# Patient Record
Sex: FEMALE | Race: WHITE | HISPANIC OR LATINO | Employment: UNEMPLOYED | URBAN - METROPOLITAN AREA
[De-identification: names, ages, dates, MRNs, and addresses within clinical notes are randomized per-mention and may not be internally consistent; named-entity substitution may affect disease eponyms.]

---

## 2017-01-26 ENCOUNTER — ALLSCRIPTS OFFICE VISIT (OUTPATIENT)
Dept: OTHER | Facility: OTHER | Age: 27
End: 2017-01-26

## 2017-04-04 DIAGNOSIS — Z97.5 PRESENCE OF (INTRAUTERINE) CONTRACEPTIVE DEVICE: ICD-10-CM

## 2017-04-06 ENCOUNTER — HOSPITAL ENCOUNTER (OUTPATIENT)
Dept: RADIOLOGY | Age: 27
Discharge: HOME | End: 2017-04-06
Payer: COMMERCIAL

## 2017-04-06 DIAGNOSIS — Z97.5 PRESENCE OF (INTRAUTERINE) CONTRACEPTIVE DEVICE: ICD-10-CM

## 2017-04-06 PROCEDURE — 76856 US EXAM PELVIC COMPLETE: CPT

## 2017-04-06 PROCEDURE — 76830 TRANSVAGINAL US NON-OB: CPT

## 2018-01-09 NOTE — PROGRESS NOTES
2016         RE: Chance Jefferson                                    To: [facility]   MR#: [id number]                                   [address]   : [place] [address]   ENC: [id number]                             Fax: [phone]   (Exam #: [id number])      The LMP of this 22year old,  G4, P1-0-2-1 patient was OCT 21 2015, giving   her an ART of 2016 and a current gestational age of 42 weeks 2 days   by dates  A sonographic examination was performed on 2016 using real   time equipment  The ultrasound examination was performed using abdominal   technique  The patient has a BMI of 40 6  Her blood pressure today was   117/76  Earliest ultrasound found in her record: 16  12w0d  16  ART      Problem list:   1  Morbid obesity   2  Chronic hypertension   3  Previous C/S      Cardiac motion was observed at 157 bpm       INDICATIONS      chronic hypertension   morbid obesity   evaluate amniotic fluid volume      Exam Types      Amniotic Fluid Index      RESULTS      Fetus # 1 of 1   Vertex presentation   Placenta Location = Anterior   No placenta previa   Placenta Grade = II      AMNIOTIC FLUID      Q1: 6 2      Q2: 7 1      Q3: 4 7      Q4: 4 8   JUSTYN Total = 22 8 cm   Amniotic Fluid: Normal      The NST was reactive with no decelerations  IMPRESSION      Wilson IUP   Vertex presentation   Regular fetal heart rate of 157 bpm   Anterior placenta   No placenta previa      GENERAL COMMENT      Her follow up care should include twice weekly NST's and a once weekly   amniotic fluid assessment, along with her daily kick counts  KOURTNEY Mayfield M D     Maternal-Fetal Medicine   Electronically signed 16 16:52

## 2018-01-10 NOTE — PROGRESS NOTES
2016         RE: Shamika Frazier                                    To: [facility]   MR#: [id number]                                   [address]   : [place] [address] [facility], [address] [place] [address]   ENC: [id number]                             Fax: [phone]   (Exam #: [id number])      The LMP of this 22year old,  G4, P1-0-2-1 patient was OCT 21 2015, giving   her an ART of 2016 and a current gestational age of 29 weeks 2 days   by dates  A sonographic examination was performed on 2016 using   real time equipment  The ultrasound examination was performed using   abdominal technique  The patient has a BMI of 39 9  Her initial blood   pressure today was 135/91, and it was later recorded at 137/66  Earliest ultrasound found in her record: 16  12w0d  16  ART      Problem list:   1  Morbid obesity   2  Gestational hypertension vs  chronic hypertension   3  Previous C/S      Cardiac motion was observed at 132 bpm       INDICATIONS      chronic hypertension      Exam Types      Amniotic Fluid Index   NST      RESULTS      Fetus # 1 of 1   Vertex presentation   Placenta Location = Anterior   No placenta previa   Placenta Grade = II      AMNIOTIC FLUID      Q1: 4 7      Q2: 5 9      Q3: 5 3      Q4: 4 9   JUSTYN Total = 20 8 cm   Amniotic Fluid: Normal      IMPRESSION      Wilson IUP   Vertex presentation   Regular fetal heart rate of 132 bpm   Anterior placenta   No placenta previa      RECOMMENDATION      JUSTYN: 1 Week   NST: 2X per week      KOURTNEY Shannon M D     Maternal-Fetal Medicine   Electronically signed 16 14:30

## 2018-01-13 NOTE — PROGRESS NOTES
2016         RE: Eufemia Lai                                    To: [facility]   MR#: [id number]                                   [address]   : [place] [address] [facility] [address], [address] [facility]   ENC: [id number]                             Fax: [phone]   (Exam #: [id number])      The LMP of this 22year old,  G4, P1-0-2-1 patient was OCT 21 2015, giving   her an ART of 2016 and a current gestational age of 42 weeks 2 days   by dates  A sonographic examination was performed on 2016 using real   time equipment  The ultrasound examination was performed using abdominal   technique  The patient has a BMI of 40 4  Her blood pressure today was   116/80  Earliest ultrasound found in her record: 16  12w0d  16  ART      Problem list:   1  Morbid obesity   2  Chronic hypertension   3  Previous C/S      Cardiac motion was observed at 161 bpm       INDICATIONS      chronic hypertension   morbid obesity      Exam Types      Level I      RESULTS      Fetus # 1 of 1   Vertex presentation   Fetal growth appeared normal   Placenta Location = Anterior   No placenta previa   Placenta Grade = II      MEASUREMENTS (* Included In Average GA)      AC              33 9 cm        38 weeks 0 days* (81%)   BPD              8 8 cm        35 weeks 4 days* (40%)   HC              32 7 cm        36 weeks 4 days* (44%)   Femur            7 1 cm        35 weeks 6 days* (53%)      Cerebellum       5 1 cm        36 weeks 4 days   CisternaMagna    8 1 mm      HC/AC           0 97   FL/AC           0 21   FL/BPD          0 81   EFW (Ac/Fl/Hc)  3147 grams - 6 lbs 15 oz                 (64%)      THE AVERAGE GESTATIONAL AGE is 36 weeks 4 days +/- 21 days        AMNIOTIC FLUID      Q1: 5 2      Q2: 7 5      Q3: 3 5      Q4: 2 6   JUSTYN Total = 18 8 cm   Amniotic Fluid: Normal      ANATOMY      Head                                    Normal   Heart Normal   Stomach                                 Normal   Right Kidney                            Normal   Left Kidney                             Normal   Bladder                                 Normal   Placenta                                Normal      ANATOMY DETAILS      Visualized Appearing Sonographically Normal:   HEAD: (Calvarium, BPD Level, Cavum, Lateral Ventricles, Choroid Plexus,   Cerebellum, Cisterna Magna); HEART: (Four Chamber View, Proximal Left   Outflow, Proximal Right Outflow, 3 Vessel Trachea, Short Axis of Greater   Vessels, Interventricular Septum, Interatrial Septum, Cardiac Axis,   Cardiac Position);    STOMACH, RIGHT KIDNEY, LEFT KIDNEY, BLADDER, PLACENTA      IMPRESSION      Wilson IUP   36 weeks and 4 days by this ultrasound  (ART=JUL 25 2016)   Vertex presentation   Fetal growth appeared normal   Regular fetal heart rate of 161 bpm   Anterior placenta   No placenta previa      GENERAL COMMENT      On exam today the patient appears well, in no acute distress, and denies   any complaints  Her abdomen is non-tender  There has been appropriate interval fetal growth  Good fetal movement and   tone are seen  The amniotic fluid volume appears normal   The placenta is   anterior and it appears sonographically normal    The patient was informed   of today's findings and all of her questions were answered  The   limitations of ultrasound were reviewed with the patient  Preeclampsia   precautions were reviewed with the patient  The patient verbalized her   understanding of these instructions  We discussed follow-up in detail and I recommend the patient return return   for continued twice weekly antepartum surveillance for the indication of   chronic hypertension  Thank you very much for allowing us to participate in the care of this   very nice patient  Should you have any questions, please do not hesitate   to contact our office        Please note, in addition to the time spent discussing the results of the   ultrasound, I spent approximately 10 minutes of face-to-face time with the   patient, greater than 50% of which was spent in counseling and the   coordination of care for this patient  KOURTNEY Wright M D     Electronically signed 07/01/16 13:04

## 2018-01-14 VITALS
WEIGHT: 196 LBS | HEIGHT: 63 IN | DIASTOLIC BLOOD PRESSURE: 88 MMHG | SYSTOLIC BLOOD PRESSURE: 134 MMHG | BODY MASS INDEX: 34.73 KG/M2

## 2018-01-15 NOTE — PROGRESS NOTES
2016         RE: Linn Duncan                                    To: [facility]   MR#: [id number]                                   [address]   : [place] [address] [facility] 100 [facility]   ENC: [id number]                             Fax: [phone]   (Exam #: [id number])      The LMP of this 22year old,  G4, P1-0-2-1 patient was OCT 21 2015, giving   her an ART of 2016 and a current gestational age of 29 weeks 6 days   by dates  A sonographic examination was performed on 2016 using   real time equipment  The ultrasound examination was performed using   abdominal technique  The patient has a BMI of 39 7  Her blood pressure   today was 130/75  Earliest ultrasound found in her record: 16  12w0d  16  ART      Problem list:   1  Increased BMI with a normal Glucola screen   2  Gestational HTN- Increasing blood pressures since  at 20+   weeks  Her mother has a history of preeclampsia  She has a bp of 148/81 in   our system on 2013 at 5 weeks of pregnancy that resulted in a 6 week   missed ab  She denies any other history of HTN  3  Hx of a prior CS for a 7lb 5 oz baby  The placenta is not near her   prior  section scar  4  She had declined genetic screening  Cardiac motion was observed at 146 bpm       INDICATIONS      previous    chronic hypertension      Exam Types      Amniotic Fluid Index      RESULTS      Fetus # 1 of 1   Vertex presentation   Placenta Location = Anterior   No placenta previa   Placenta Grade = II      AMNIOTIC FLUID      Q1: 3 6      Q2: 2 6      Q3: 5 7      Q4: 5 8   JUSTYN Total = 17 7 cm   Amniotic Fluid: Normal      BIOPHYSICAL PROFILE      The Biophysical Profile score was 10/10     Breathin  Movement: 2  Tone: 2  AFV: 2  NST: 2      IMPRESSION      Wilson IUP   Vertex presentation   Irregular fetal heart rate of 146 bpm   Anterior placenta   No placenta previa      GENERAL COMMENT      The patient presented today for antepartum fetal surveillance secondary to   chronic htn  She had a reassuring biophysical profile  The NST was   reactive and reassuring  The amniotic fluid index was 17 7cm, which is   normal for gestational age  Recommend continued twice weekly fetal testing secondary to chronic htn  Thank very much for allowing us to participate in the care of your   patient  Should you have any questions, please do not hesitate to contact   our office  KOURTNEY Jeronimo M D     Electronically signed 06/14/16 15:32

## 2018-01-16 NOTE — PROGRESS NOTES
KATHLEEN 10 2016         RE: Sarina White                                    To: [facility]   MR#: [id number]                                   [address]   : [place] [address] [facility], [address] [facility]   ENC: [id number]                             Fax: [phone]   (Exam #: [id number])      The LMP of this 22year old,  G4, P1-0-2-1 patient was OCT 21 2015, giving   her an ART of 2016 and a current gestational age of 29 weeks 2 days   by dates  A sonographic examination was performed on KATHLEEN 10 2016 using   real time equipment  The ultrasound examination was performed using   abdominal technique  The patient has a BMI of 38 8  Her blood pressure   today was 134/74  Earliest ultrasound found in her record: 16  12w0d  16  ART      Problem list:   1  Increased BMI with a normal Glucola screen   2  Gestational HTN- Increasing blood pressures since  at 20+   weeks  Her mother has a history of preeclampsia  She has a bp of 148/81 in   our system on 2013 at 5 weeks of pregnancy that resulted in a 6 week   missed ab  She denies any other history of HTN  3  Hx of a prior CS for a 7lb 5 oz baby  The placenta is not near her   prior  section scar  4  She had declined genetic screening  Cardiac motion was observed at 154 bpm       INDICATIONS      previous    morbid obesity   hypertension, pregnancy induced      Exam Types      Amniotic Fluid Index      RESULTS      Fetus # 1 of 1   Vertex presentation   Placenta Location = Anterior   Placenta Grade = II      AMNIOTIC FLUID      Q1: 4 5      Q2: 3 8      Q3: 4 5      Q4: 6 2   JUSTYN Total = 19 0 cm   Amniotic Fluid: Normal      BIOPHYSICAL PROFILE      The Biophysical Profile score was 8/8     Breathin  Movement: 2  Tone: 2  AFV: 2      IMPRESSION      Wilson IUP   Vertex presentation   Regular fetal heart rate of 154 bpm   Anterior placenta      GENERAL COMMENT The nonstress test today showed a normal baseline, the presence of   accelerations, moderate variability, and no decelerations and thus the NST   was reassuring  The amniotic fluid was normal today as well and thus both   of these tests showed reassuring results  KOURTNEY Gutierres M D     Maternal-Fetal Medicine   Electronically signed 06/10/16 11:13

## 2018-01-16 NOTE — PROGRESS NOTES
KATHLEEN 3 2016         RE: David Ring                                    To: [facility]   MR#: [id number]                                   [address]   : [place] [address] [facility], [address] [facility]   ENC: [id number]                             Fax: [phone]   (Exam #: [id number])      The LMP of this 22year old,  G4, P1-0-2-1 patient was OCT 21 2015, giving   her an ART of 2016 and a current gestational age of 26 weeks 2 days   by dates  A sonographic examination was performed on KATHLEEN 3 2016 using real   time equipment  The ultrasound examination was performed using abdominal   technique  The patient has a BMI of 39 5  Her blood pressure today was   111/78  Earliest ultrasound found in her record: 16  12w0d  16  ART      Problem list:   1  Increased BMI with a normal Glucola screen   2  Gestational HTN- Increasing blood pressures since  at 20+   weeks  Her mother has a history of preeclampsia  She has a bp of 148/81 in   our system on 2013 at 5 weeks of pregnancy that resulted in a 6 week   missed ab  She denies any other history of HTN  3  Hx of a prior CS for a 7lb 5 oz baby  The placenta is not near her   prior  section scar  4  She had declined genetic screening        Cardiac motion was observed at 155 bpm       INDICATIONS      previous    Interval growth assesment   morbid obesity      Exam Types      Level I      RESULTS      Fetus # 1 of 1   Vertex presentation   Fetal growth appeared normal   Placenta Location = Anterior   No placenta previa   Placenta Grade = II      AMNIOTIC FLUID      Q1: 5 2      Q2: 6 4      Q3: 4 4      Q4: 7 9   JUSTYN Total = 23 9 cm   Amniotic Fluid: Normal      MEASUREMENTS (* Included In Average GA)      AC              27 9 cm        32 weeks 0 days* (42%)   BPD              8 3 cm        33 weeks 1 day * (57%)   HC              29 9 cm        32 weeks 6 days* (42%) Femur            6 4 cm        32 weeks 4 days* (57%)      Cerebellum       4 2 cm        34 weeks 3 days      HC/AC           1 07   FL/AC           0 23   FL/BPD          0 77   EFW (Ac/Fl/Hc)  1971 grams - 4 lbs 6 oz                 (46%)      THE AVERAGE GESTATIONAL AGE is 32 weeks 5 days +/- 18 days  ANATOMY COMMENTS      Fetal anatomy has been previously documented; no anomalies were   identified  No fetal structural abnormality is identified on the Level I   survey today  Follow up anatomy of the lateral ventricles, 4 chamber view,   short axis, diaphragm,  kidneys, stomach and bladder appear normal  Fetal   interval growth and amniotic fluid volume are normal The placenta is not   near her prior  section scar  BIOPHYSICAL PROFILE      The Biophysical Profile score was 10/10  Breathin  Movement: 2  Tone: 2  AFV: 2  NST: 2      IMPRESSION      Wilson IUP   32 weeks and 5 days by this ultrasound  (ART=2016)   Vertex presentation   Fetal growth appeared normal   Regular fetal heart rate of 155 bpm   Anterior placenta   No placenta previa      GENERAL COMMENT      The patient was informed of the findings and counseled about the   limitations of the exam in detecting all forms of fetal congenital   abnormalities  She denies any vaginal bleeding or uterine cramping/contractions  She does   feel fetal movement  Exam shows she is comfortable and her abdomen is non tender  Follow up recommended:   1  Recommend baseline preeclamptic labs which were ordered an returned as   normal except for mild anemia  Recommend iron supplementation  2  Will start twice weekly nst and weekly bahman till delivery  3  Recommend a growth scan in 4 weeks  4  Will treat as possible GHTN but she may have CHTN based on elevated bps   seen at 5 weeks in a prior pregnancy     5  Recommend delivery at 39 weeks since she may have CHTN unless she   develops signs of preeclampsia at which time recommend delivery at 37   weeks  Romayne Keys, R D M S Veria Guadeloupe, M D     Maternal-Fetal Medicine   Electronically signed 06/04/16 11:40

## 2018-01-17 NOTE — PROGRESS NOTES
JUL 15 2016         RE: Joan Kebede                                    To: [facility]   MR#: [id number]                                   [address]   : [place] [address] [facility] [address] [facility]   ENC: [id number]                             Fax: [phone]   (Exam #: [id number])      The LMP of this 22year old,  G4, P1-0-2-1 patient was OCT 21 2015, giving   her an ART of 2016 and a current gestational age of 37 weeks 2 days   by dates  A sonographic examination was performed on JUL 15 2016 using   real time equipment  The ultrasound examination was performed using   abdominal technique  The patient has a BMI of 41 4  Her blood pressure   today was 111/75  Earliest ultrasound found in her record: 16  12w0d  16  ART      Problem list:   1  Morbid obesity   2  Chronic hypertension   3  Previous C/S      Cardiac motion was observed at 147 bpm       INDICATIONS      chronic hypertension   morbid obesity      Exam Types      Amniotic Fluid Index   NST      RESULTS      Fetus # 1 of 1   Vertex presentation   Placenta Location = Anterior   No placenta previa   Placenta Grade = III      The NST was reactive with no decelerations  AMNIOTIC FLUID      Q1: 5 4      Q2: 6 4      Q3: 3 7      Q4: 4 3   JUSTYN Total = 19 8 cm   Amniotic Fluid: Normal      IMPRESSION      Wilson IUP   Vertex presentation   Regular fetal heart rate of 147 bpm   Anterior placenta   No placenta previa      RECOMMENDATION      JUSTYN: 1 Week   NST: 2X per week      KOURTNEY Bennett Ala, M D     Maternal-Fetal Medicine   Electronically signed 07/15/16 12:59

## 2018-01-17 NOTE — RESULT NOTES
Verified Results  (1) CBC/PLT/DIFF [id number] 09:52AM Edson Miller     Test Name Result Flag Reference   WBC COUNT 9 64 Thousand/uL  4 31-10 16   RBC COUNT 4 09 Million/uL  3 81-5 12   HEMOGLOBIN 10 4 g/dL L 11 5-15 4   HEMATOCRIT 32 8 % L 34 8-46  1   MCV 80 fL L 82-98   MCH 25 4 pg L 26 8-34 3   MCHC 31 7 g/dL  31 4-37 4   RDW 14 7 %  11 6-15 1   MPV 10 5 fL  8 9-12 7   PLATELET COUNT 090 Thousands/uL  149-390   nRBC AUTOMATED 0 /100 WBCs     NEUTROPHILS RELATIVE PERCENT 77 % H 43-75   LYMPHOCYTES RELATIVE PERCENT 15 %  14-44   MONOCYTES RELATIVE PERCENT 7 %  4-12   EOSINOPHILS RELATIVE PERCENT 1 %  0-6   BASOPHILS RELATIVE PERCENT 0 %  0-1   NEUTROPHILS ABSOLUTE COUNT 7 34 Thousands/?L  1 85-7 62   LYMPHOCYTES ABSOLUTE COUNT 1 46 Thousands/?L  0 60-4 47   MONOCYTES ABSOLUTE COUNT 0 67 Thousand/?L  0 17-1 22   EOSINOPHILS ABSOLUTE COUNT 0 11 Thousand/?L  0 00-0 61   BASOPHILS ABSOLUTE COUNT 0 01 Thousands/?L  0 00-0 10     (1) URIC ACID [id number] 09:52AM Edson Miller     Test Name Result Flag Reference   URIC ACID 2 6 mg/dL  2 0-6 8   Specimen collection should occur prior to Metamizole administration due to the potential for falsely depressed results  (1) COMPREHENSIVE METABOLIC PANEL [id number] 56:19TK Edson Miller     Test Name Result Flag Reference   GLUCOSE,RANDM 104 mg/dL     If the patient is fasting, the ADA then defines impaired fasting glucose as > 100 mg/dL and diabetes as > or equal to 123 mg/dL     SODIUM 138 mmol/L  136-145   POTASSIUM 4 1 mmol/L  3 5-5 3   CHLORIDE 105 mmol/L  100-108   CARBON DIOXIDE 24 mmol/L  21-32   ANION GAP (CALC) 9 mmol/L  4-13   BLOOD UREA NITROGEN 5 mg/dL  5-25   CREATININE 0 56 mg/dL L 0 60-1 30   Standardized to IDMS reference method   CALCIUM 9 1 mg/dL  8 3-10 1   BILI, TOTAL 0 17 mg/dL L 0 20-1 00   ALK PHOSPHATAS 129 U/L H    ALT (SGPT) 15 U/L  12-78   AST(SGOT) 14 U/L  5-45   ALBUMIN 2 8 g/dL L 3 5-5 0   TOTAL PROTEIN 6 4 g/dL  6 4-8 2   eGFR Non-African American      >60 0 ml/min/1 73sq m   [facility] Disease Education Program recommendations are as follows:  GFR calculation is accurate only with a steady state creatinine  Chronic Kidney disease less than 60 ml/min/1 73 sq  meters  Kidney failure less than 15 ml/min/1 73 sq  meters       (1) LD (LDH) [id number] 09:52AM Ethan Miller     Test Name Result Flag Reference   LD (LDH) 149 U/L       (1) URINE PROTEIN CREATININE RATIO [id number] 09:52AM Ethan Miller     Test Name Result Flag Reference   CREATININE URINE 210 0 mg/dL     URINE PROTEIN:CREATININE RATIO 0 10  0 00-0 10   URINE PROTEIN 21 mg/dL

## 2018-01-17 NOTE — MISCELLANEOUS
Message  Left message on pt cell phone with preeclamptic lab results-WNL  Pt to contact [facility] if questions  Active Problems    1  Anemia of pregnancy (648 20,285 9) (O99 019)   2  Asthma (493 90) (J45 909)   3  BMI 36 0-36 9,adult (V85 36) (Z68 36)   4  Encounter for pregnancy related examination in third trimester (V22 1) (Z34 83)   5  Hyperemesis of pregnancy (643 00) (O21 0)   6  Obesity in pregnancy (649 10) (O99 210)   7  Pregnancy (V22 2) (Z33 1)   8  Pregnancy-induced hypertension in third trimester (642 33) (O13 3)   9  Previous  section complicating pregnancy (118 97) (O34 21)    Current Meds   1  Iron 325 (65 Fe) MG Oral Tablet; TAKE 1 TAB BEFORE BED ON EMPTY STOMACH OR   WITH ORANGE JUICE  DONT TAKEWITH MILK OR CALCIUM PRODUCTS; Therapy: [id number] to (Dedrick Mejia)  Requested for: [id number]; Last   Rx:2016 Ordered   2  Prenatal Vitamin TABS; TAKE 1 TABLET DAILY AS DIRECTED; Therapy: (Recorded:2016) to Recorded   3  Tylenol TABS; TAKE TABLET  PRN; Therapy: (Recorded:2016) to Recorded    Allergies    1  No Known Drug Allergies    2  Animal dander   3  No Known Food Allergies   4  Pollen   5   Seasonal    Signatures   Electronically signed by : Zan Padilla RN; 2016  1:36PM EST                       (Author)

## 2020-07-28 ENCOUNTER — TELEPHONE (OUTPATIENT)
Dept: OBGYN CLINIC | Facility: CLINIC | Age: 30
End: 2020-07-28

## 2020-08-06 ENCOUNTER — TELEPHONE (OUTPATIENT)
Dept: OBGYN CLINIC | Facility: CLINIC | Age: 30
End: 2020-08-06

## 2020-08-06 ENCOUNTER — INITIAL PRENATAL (OUTPATIENT)
Dept: OBGYN CLINIC | Facility: CLINIC | Age: 30
End: 2020-08-06
Payer: COMMERCIAL

## 2020-08-06 VITALS
SYSTOLIC BLOOD PRESSURE: 130 MMHG | BODY MASS INDEX: 43.05 KG/M2 | DIASTOLIC BLOOD PRESSURE: 74 MMHG | TEMPERATURE: 98.2 F | WEIGHT: 243 LBS

## 2020-08-06 DIAGNOSIS — Z34.82 PRENATAL CARE, SUBSEQUENT PREGNANCY, SECOND TRIMESTER: ICD-10-CM

## 2020-08-06 DIAGNOSIS — O10.919 CHRONIC HYPERTENSION IN PREGNANCY: ICD-10-CM

## 2020-08-06 DIAGNOSIS — Z34.90 PREGNANCY, UNSPECIFIED GESTATIONAL AGE: Primary | ICD-10-CM

## 2020-08-06 PROCEDURE — 99213 OFFICE O/P EST LOW 20 MIN: CPT | Performed by: PHYSICIAN ASSISTANT

## 2020-08-06 RX ORDER — CETIRIZINE HYDROCHLORIDE 10 MG/1
TABLET ORAL
COMMUNITY
End: 2021-05-14

## 2020-08-06 RX ORDER — ACETAMINOPHEN 325 MG/1
TABLET ORAL
COMMUNITY
End: 2021-05-14

## 2020-08-06 RX ORDER — CALCIUM CARBONATE 750 MG/1
TABLET, CHEWABLE ORAL
COMMUNITY
Start: 2020-06-05 | End: 2021-05-14

## 2020-08-06 RX ORDER — PNV NO.95/FERROUS FUM/FOLIC AC 28MG-0.8MG
1 TABLET ORAL DAILY
COMMUNITY
End: 2020-08-06

## 2020-08-06 RX ORDER — DIMENHYDRINATE 50 MG/1
TABLET ORAL
COMMUNITY
Start: 2020-04-05 | End: 2021-05-14

## 2020-08-06 RX ORDER — ALBUTEROL SULFATE 90 UG/1
2 AEROSOL, METERED RESPIRATORY (INHALATION) EVERY 4 HOURS PRN
COMMUNITY
End: 2022-05-25 | Stop reason: SDUPTHER

## 2020-08-06 NOTE — PROGRESS NOTES
NOB: Patient is transfer at 27wks from [place], reports pregnancy going well so far  Reports history of chronic HTN which has continued through this pregnancy  Not on any hypertensive medications  Good FM, Mild nausea, no vomiting, takes dramamine as needed  Reports mild cramping, more stretching pains, no contractions  Mild swelling in feet and ankles was worse during drive from [place] 3 weeks ago, has improved significantly  Improves with rest  No HA, cramping, VB, LOF, domestic violence, or smoking  Tolerating PNV  Script for 1 hr GTT, CBC, RPR, as well as baseline preeclamptic lab work given  Patient is not planning on breastfeeding  30 wk packet given, reviewed perineal massage although patient is planning for repeat   Reviewed birth control options  Patient is considering tubal ligation  State insurance consent signed  Reviewed recommendation for Level 2/growth ultrasound at [facility]  Referral given and [facility] information given  Urine: neg protein/neg glucose  Records release for [place] records signed today  Continue routine prenatal care  Return to office in 4 weeks for ob check

## 2020-08-06 NOTE — TELEPHONE ENCOUNTER
----- Message from Cody Fay PA-C sent at 2020  3:38 PM EDT -----  Please schedule patient for repeat  at 39 weeks

## 2020-08-08 ENCOUNTER — APPOINTMENT (OUTPATIENT)
Dept: LAB | Facility: HOSPITAL | Age: 30
End: 2020-08-08
Payer: COMMERCIAL

## 2020-08-08 DIAGNOSIS — O10.919 CHRONIC HYPERTENSION IN PREGNANCY: ICD-10-CM

## 2020-08-08 DIAGNOSIS — Z34.82 PRENATAL CARE, SUBSEQUENT PREGNANCY, SECOND TRIMESTER: ICD-10-CM

## 2020-08-08 LAB
ALBUMIN SERPL BCP-MCNC: 2.7 G/DL (ref 3.5–5)
ALP SERPL-CCNC: 91 U/L (ref 46–116)
ALT SERPL W P-5'-P-CCNC: 16 U/L (ref 12–78)
ANION GAP SERPL CALCULATED.3IONS-SCNC: 10 MMOL/L (ref 4–13)
AST SERPL W P-5'-P-CCNC: 13 U/L (ref 5–45)
BACTERIA UR QL AUTO: ABNORMAL /HPF
BASOPHILS # BLD AUTO: 0.02 THOUSANDS/ΜL (ref 0–0.1)
BASOPHILS NFR BLD AUTO: 0 % (ref 0–1)
BILIRUB SERPL-MCNC: 0.2 MG/DL (ref 0.2–1)
BILIRUB UR QL STRIP: NEGATIVE
BUN SERPL-MCNC: 5 MG/DL (ref 5–25)
CALCIUM SERPL-MCNC: 8.6 MG/DL (ref 8.3–10.1)
CHLORIDE SERPL-SCNC: 105 MMOL/L (ref 100–108)
CLARITY UR: ABNORMAL
CO2 SERPL-SCNC: 24 MMOL/L (ref 21–32)
COLOR UR: YELLOW
CREAT SERPL-MCNC: 0.74 MG/DL (ref 0.6–1.3)
CREAT UR-MCNC: 120 MG/DL
EOSINOPHIL # BLD AUTO: 0.28 THOUSAND/ΜL (ref 0–0.61)
EOSINOPHIL NFR BLD AUTO: 3 % (ref 0–6)
ERYTHROCYTE [DISTWIDTH] IN BLOOD BY AUTOMATED COUNT: 13.4 % (ref 11.6–15.1)
GFR SERPL CREATININE-BSD FRML MDRD: 109 ML/MIN/1.73SQ M
GLUCOSE 1H P 50 G GLC PO SERPL-MCNC: 146 MG/DL
GLUCOSE P FAST SERPL-MCNC: 145 MG/DL (ref 65–99)
GLUCOSE UR STRIP-MCNC: ABNORMAL MG/DL
HCT VFR BLD AUTO: 33.7 % (ref 34.8–46.1)
HGB BLD-MCNC: 10.8 G/DL (ref 11.5–15.4)
HGB UR QL STRIP.AUTO: NEGATIVE
IMM GRANULOCYTES # BLD AUTO: 0.12 THOUSAND/UL (ref 0–0.2)
IMM GRANULOCYTES NFR BLD AUTO: 1 % (ref 0–2)
KETONES UR STRIP-MCNC: NEGATIVE MG/DL
LDH FLD L TO P-CCNC: 125 U/L
LEUKOCYTE ESTERASE UR QL STRIP: ABNORMAL
LYMPHOCYTES # BLD AUTO: 1.39 THOUSANDS/ΜL (ref 0.6–4.47)
LYMPHOCYTES NFR BLD AUTO: 15 % (ref 14–44)
MCH RBC QN AUTO: 28.3 PG (ref 26.8–34.3)
MCHC RBC AUTO-ENTMCNC: 32 G/DL (ref 31.4–37.4)
MCV RBC AUTO: 88 FL (ref 82–98)
MONOCYTES # BLD AUTO: 0.52 THOUSAND/ΜL (ref 0.17–1.22)
MONOCYTES NFR BLD AUTO: 6 % (ref 4–12)
NEUTROPHILS # BLD AUTO: 7.07 THOUSANDS/ΜL (ref 1.85–7.62)
NEUTS SEG NFR BLD AUTO: 75 % (ref 43–75)
NITRITE UR QL STRIP: NEGATIVE
NON-SQ EPI CELLS URNS QL MICRO: ABNORMAL /HPF
NRBC BLD AUTO-RTO: 0 /100 WBCS
PH UR STRIP.AUTO: 6.5 [PH]
PLATELET # BLD AUTO: 201 THOUSANDS/UL (ref 149–390)
PMV BLD AUTO: 11 FL (ref 8.9–12.7)
POTASSIUM SERPL-SCNC: 3.9 MMOL/L (ref 3.5–5.3)
PROT SERPL-MCNC: 6.5 G/DL (ref 6.4–8.2)
PROT UR STRIP-MCNC: NEGATIVE MG/DL
PROT UR-MCNC: 20 MG/DL
PROT/CREAT UR: 0.17 MG/G{CREAT} (ref 0–0.1)
RBC # BLD AUTO: 3.82 MILLION/UL (ref 3.81–5.12)
RBC #/AREA URNS AUTO: ABNORMAL /HPF
SODIUM SERPL-SCNC: 139 MMOL/L (ref 136–145)
SP GR UR STRIP.AUTO: 1.02 (ref 1–1.03)
URATE SERPL-MCNC: 3.3 MG/DL (ref 2–6.8)
UROBILINOGEN UR QL STRIP.AUTO: 0.2 E.U./DL
WBC # BLD AUTO: 9.4 THOUSAND/UL (ref 4.31–10.16)
WBC #/AREA URNS AUTO: ABNORMAL /HPF

## 2020-08-08 PROCEDURE — 82570 ASSAY OF URINE CREATININE: CPT

## 2020-08-08 PROCEDURE — 84156 ASSAY OF PROTEIN URINE: CPT

## 2020-08-08 PROCEDURE — 83615 LACTATE (LD) (LDH) ENZYME: CPT

## 2020-08-08 PROCEDURE — 84550 ASSAY OF BLOOD/URIC ACID: CPT

## 2020-08-08 PROCEDURE — 86592 SYPHILIS TEST NON-TREP QUAL: CPT

## 2020-08-08 PROCEDURE — 81001 URINALYSIS AUTO W/SCOPE: CPT | Performed by: PHYSICIAN ASSISTANT

## 2020-08-08 PROCEDURE — 36415 COLL VENOUS BLD VENIPUNCTURE: CPT

## 2020-08-08 PROCEDURE — 85025 COMPLETE CBC W/AUTO DIFF WBC: CPT

## 2020-08-08 PROCEDURE — 82950 GLUCOSE TEST: CPT

## 2020-08-08 PROCEDURE — 80053 COMPREHEN METABOLIC PANEL: CPT

## 2020-08-10 ENCOUNTER — TELEPHONE (OUTPATIENT)
Dept: PERINATAL CARE | Facility: CLINIC | Age: 30
End: 2020-08-10

## 2020-08-10 DIAGNOSIS — R73.09 ABNORMAL GLUCOSE TOLERANCE TEST (GTT): Primary | ICD-10-CM

## 2020-08-10 LAB — RPR SER QL: NORMAL

## 2020-08-10 NOTE — TELEPHONE ENCOUNTER
Attempted to reach patient by phone and left voicemail to confirm appointment for MFM ultrasound  1 support person ( must be over the age of 15) may accompany you for your appointment  If you or your support person have traveled outside the state in the past 2 weeks, please call and notify our office today #[phone]  You and your support person must wear a mask ,covering nose and mouth,during your entire visit  You and your support person will have temperature screened upon arrival     To minimize your exposure in our waiting room, please call our office prior to entering the building  Check in and rooming questions will be done via phone  We will give you directions when to enter for your appointment  Inside office # provided:  [place] Bhagat line: [phone]  [place] line:  [phone]  [place] line:  [address] line:  [phone]  [place] Mcnamara line:  [phone]  [place] line:  [phone]    IF you are not feeling well- cough, fever, shortness of breath or any flu like symptoms, contact your primary care physician or [address] [place] [address] [place]    Any questions with these instructions please call Maternal Fetal Medicine nurse line today @ # [phone]

## 2020-08-11 ENCOUNTER — ROUTINE PRENATAL (OUTPATIENT)
Dept: PERINATAL CARE | Facility: OTHER | Age: 30
End: 2020-08-11
Payer: COMMERCIAL

## 2020-08-11 VITALS
BODY MASS INDEX: 43.12 KG/M2 | WEIGHT: 243.4 LBS | HEART RATE: 108 BPM | SYSTOLIC BLOOD PRESSURE: 126 MMHG | TEMPERATURE: 96.9 F | DIASTOLIC BLOOD PRESSURE: 95 MMHG | HEIGHT: 63 IN

## 2020-08-11 DIAGNOSIS — Z34.82 PRENATAL CARE, SUBSEQUENT PREGNANCY, SECOND TRIMESTER: ICD-10-CM

## 2020-08-11 DIAGNOSIS — J45.909 ASTHMA DURING PREGNANCY: ICD-10-CM

## 2020-08-11 DIAGNOSIS — Z3A.28 28 WEEKS GESTATION OF PREGNANCY: ICD-10-CM

## 2020-08-11 DIAGNOSIS — Z36.89 ENCOUNTER FOR FETAL ANATOMIC SURVEY: ICD-10-CM

## 2020-08-11 DIAGNOSIS — J45.40 MODERATE PERSISTENT ASTHMA, UNSPECIFIED WHETHER COMPLICATED: Primary | ICD-10-CM

## 2020-08-11 DIAGNOSIS — O99.213 MATERNAL MORBID OBESITY, ANTEPARTUM, THIRD TRIMESTER (HCC): ICD-10-CM

## 2020-08-11 DIAGNOSIS — O99.519 ASTHMA DURING PREGNANCY: ICD-10-CM

## 2020-08-11 DIAGNOSIS — O10.919 CHRONIC HYPERTENSION IN PREGNANCY: ICD-10-CM

## 2020-08-11 DIAGNOSIS — E66.01 MATERNAL MORBID OBESITY, ANTEPARTUM, THIRD TRIMESTER (HCC): ICD-10-CM

## 2020-08-11 PROBLEM — Z34.90 PREGNANCY: Status: RESOLVED | Noted: 2020-08-06 | Resolved: 2020-08-11

## 2020-08-11 PROCEDURE — 99242 OFF/OP CONSLTJ NEW/EST SF 20: CPT | Performed by: OBSTETRICS & GYNECOLOGY

## 2020-08-11 PROCEDURE — 76811 OB US DETAILED SNGL FETUS: CPT | Performed by: OBSTETRICS & GYNECOLOGY

## 2020-08-11 RX ORDER — FLUTICASONE PROPIONATE 44 UG/1
2 AEROSOL, METERED RESPIRATORY (INHALATION) 2 TIMES DAILY
Qty: 1 INHALER | Refills: 3 | Status: SHIPPED | OUTPATIENT
Start: 2020-08-11 | End: 2020-08-28 | Stop reason: ALTCHOICE

## 2020-08-11 NOTE — PROGRESS NOTES
[address]: Ms Tammie Avila was seen today at 28w0d for anatomic survey ultrasound  See ultrasound report under "OB Procedures" tab  My recommendations are as follows:    1  Although encouraging, even a normal-appearing ultrasound cannot exclude all malformations, or the possibility of a genetic syndrome  I recommend re-evaluation of fetal growth and sub-optimally seen fetal anatomy at 34 weeks gestation  2   We reviewed her history of moderate persistent asthma  Asthma complicates 2-4% of pregnancies  The majority of women with asthma experience stability or improvement in symptoms in pregnancy, while approximately 1/3 experience worsening  Need for a rescue inhaler more than twice weekly indicates suboptimal asthma control and need for escalation in therapy  The majority of asthma medications are safe for pregnancy, and disease control is important for maternal and fetal health in pregnancy  If her symptoms remain well-controlled in pregnancy, no additional obstetric surveillance is indicated  However, if control is poor, there is a risk of fetal growth restriction, and evaluation of fetal growth in the second half of pregnancy is likely warranted  Prevention of respiratory morbidity is particularly important in pregnancy  Annual influenza recommendation is recommended, as is pneumococcal vaccination if not performed previously  She reports daily albuterol use  She does live with her parents and there are dogs in the home, which she is allergic to  We discussed minimizing triggers for her allergies and asthma as much as possible  Breathing is non-labored and she is in no distress today  I prescribed flovent 44mcg BID and instructed her to start it now, and see if it improves dependence on albuterol  We reviewed goal of albuterol use 2x/weekly or less, which would indicate control of her asthma  3  We discussed her chronic hypertension, as well as increased risk of preeclampsia  Gestational age is too advanced to benefit from initiating low dose aspirin to mitigate this risk  I recommend serial evaluation of fetal growth every 4-6 weeks  Antepartum fetal surveillance would only be indicated if medical management of hypertension is necessary  Delivery is recommended between 38 0/7 and 39 6/7 weeks gestation for isolated chronic hypertension not requiring medication  4   Obesity in pregnancy (defined as body mass index > 30 kg/m2) is associated with an increased risk of several pregnancy complications, including hypertensive disorders, diabetes, abnormal fetal growth, fetal malformations  The risk of  delivery is also increased, as are wound complications in the event of  delivery  A healthy diet and exercise, as well as appropriate gestational weight gain (no more than 11-20 pounds) can help reduce risk of these complications  150 minutes of moderate exercise per week is recommended for all pregnant women  Nutrition counseling is also available if desired   fetal surveillance is also recommended as follows:BMI >40: Evaluation of fetal growth at 28, 34 weeks gestation, as well as antepartum fetal surveillance once weekly beginning at 36 weeks gestation  5  We discussed her history of two cesareans  Repeat  is planned and is already scheduled      Please don't hesitate to contact our office with any concerns or questions   -Stephanie Olmedo MD

## 2020-08-11 NOTE — LETTER
August 11, 2020     Haven Nunn PA-C  [address]    Patient: Ty Breech   YOB: 1990   Date of Visit: 8/11/2020       Dear Dr Patricia Spicer: Thank you for referring Sudha Perez to me for evaluation  Below are my notes for this consultation  If you have questions, please do not hesitate to call me  I look forward to following your patient along with you  Sincerely,        Leonor Veliz MD        CC: No Recipients  Leonor Veliz MD  8/11/2020  5:52 PM  Sign when Signing Visit  [address]: Ms Mejia Morales was seen today at 28w0d for anatomic survey ultrasound  See ultrasound report under "OB Procedures" tab  My recommendations are as follows:    1  Although encouraging, even a normal-appearing ultrasound cannot exclude all malformations, or the possibility of a genetic syndrome  I recommend re-evaluation of fetal growth and sub-optimally seen fetal anatomy at 34 weeks gestation  2   We reviewed her history of moderate persistent asthma  Asthma complicates 9-4% of pregnancies  The majority of women with asthma experience stability or improvement in symptoms in pregnancy, while approximately 1/3 experience worsening  Need for a rescue inhaler more than twice weekly indicates suboptimal asthma control and need for escalation in therapy  The majority of asthma medications are safe for pregnancy, and disease control is important for maternal and fetal health in pregnancy  If her symptoms remain well-controlled in pregnancy, no additional obstetric surveillance is indicated  However, if control is poor, there is a risk of fetal growth restriction, and evaluation of fetal growth in the second half of pregnancy is likely warranted  Prevention of respiratory morbidity is particularly important in pregnancy  Annual influenza recommendation is recommended, as is pneumococcal vaccination if not performed previously    She reports daily albuterol use  She does live with her parents and there are dogs in the home, which she is allergic to  We discussed minimizing triggers for her allergies and asthma as much as possible  Breathing is non-labored and she is in no distress today  I prescribed flovent 44mcg BID and instructed her to start it now, and see if it improves dependence on albuterol  We reviewed goal of albuterol use 2x/weekly or less, which would indicate control of her asthma  3  We discussed her chronic hypertension, as well as increased risk of preeclampsia  Gestational age is too advanced to benefit from initiating low dose aspirin to mitigate this risk  I recommend serial evaluation of fetal growth every 4-6 weeks  Antepartum fetal surveillance would only be indicated if medical management of hypertension is necessary  Delivery is recommended between 38 0/7 and 39 6/7 weeks gestation for isolated chronic hypertension not requiring medication  4   Obesity in pregnancy (defined as body mass index > 30 kg/m2) is associated with an increased risk of several pregnancy complications, including hypertensive disorders, diabetes, abnormal fetal growth, fetal malformations  The risk of  delivery is also increased, as are wound complications in the event of  delivery  A healthy diet and exercise, as well as appropriate gestational weight gain (no more than 11-20 pounds) can help reduce risk of these complications  150 minutes of moderate exercise per week is recommended for all pregnant women  Nutrition counseling is also available if desired   fetal surveillance is also recommended as follows:BMI >40: Evaluation of fetal growth at 28, 34 weeks gestation, as well as antepartum fetal surveillance once weekly beginning at 36 weeks gestation  5  We discussed her history of two cesareans  Repeat  is planned and is already scheduled      Please don't hesitate to contact our office with any concerns or questions   -Won Oropeza MD

## 2020-08-19 ENCOUNTER — APPOINTMENT (OUTPATIENT)
Dept: LAB | Facility: HOSPITAL | Age: 30
End: 2020-08-19
Payer: COMMERCIAL

## 2020-08-19 DIAGNOSIS — R73.09 ABNORMAL GLUCOSE TOLERANCE TEST (GTT): ICD-10-CM

## 2020-08-19 LAB
GLUCOSE 1H P 100 G GLC PO SERPL-MCNC: 141 MG/DL (ref 65–179)
GLUCOSE 2H P 100 G GLC PO SERPL-MCNC: 127 MG/DL (ref 65–154)
GLUCOSE 3H P 100 G GLC PO SERPL-MCNC: 93 MG/DL (ref 65–139)
GLUCOSE P FAST SERPL-MCNC: 88 MG/DL (ref 65–99)

## 2020-08-19 PROCEDURE — 82952 GTT-ADDED SAMPLES: CPT

## 2020-08-19 PROCEDURE — 36415 COLL VENOUS BLD VENIPUNCTURE: CPT

## 2020-08-19 PROCEDURE — 82951 GLUCOSE TOLERANCE TEST (GTT): CPT

## 2020-08-28 ENCOUNTER — ROUTINE PRENATAL (OUTPATIENT)
Dept: OBGYN CLINIC | Facility: CLINIC | Age: 30
End: 2020-08-28
Payer: COMMERCIAL

## 2020-08-28 VITALS
BODY MASS INDEX: 42.77 KG/M2 | WEIGHT: 241.4 LBS | DIASTOLIC BLOOD PRESSURE: 86 MMHG | HEIGHT: 63 IN | SYSTOLIC BLOOD PRESSURE: 138 MMHG | TEMPERATURE: 97.6 F

## 2020-08-28 DIAGNOSIS — Z3A.30 30 WEEKS GESTATION OF PREGNANCY: ICD-10-CM

## 2020-08-28 DIAGNOSIS — O34.219 PREVIOUS CESAREAN SECTION COMPLICATING PREGNANCY: ICD-10-CM

## 2020-08-28 DIAGNOSIS — Z3A.30 30 WEEKS GESTATION OF PREGNANCY: Primary | ICD-10-CM

## 2020-08-28 DIAGNOSIS — Z34.93 PRENATAL CARE IN THIRD TRIMESTER: Primary | ICD-10-CM

## 2020-08-28 PROCEDURE — 99213 OFFICE O/P EST LOW 20 MIN: CPT | Performed by: OBSTETRICS & GYNECOLOGY

## 2020-09-04 ENCOUNTER — TELEPHONE (OUTPATIENT)
Dept: PERINATAL CARE | Facility: OTHER | Age: 30
End: 2020-09-04

## 2020-09-04 NOTE — TELEPHONE ENCOUNTER
Attempted to reach patient by phone and left voicemail to confirm appointment for MFM ultrasound  1 support person ( must be over the age of 15) may accompany you for your appointment  If you or your support person have traveled outside the state in the past 2 weeks, please call and notify our office today #[phone]  You and your support person must wear a mask ,covering nose and mouth,during your entire visit  You and your support person will have temperature screened upon arrival     To minimize your exposure in our waiting room, please call our office prior to entering the building  Check in and rooming questions will be done via phone  We will give you directions when to enter for your appointment  Inside office # provided:  [place] Patiño line: [phone]  [facility] [place] line:  [phone]  [facility] line:  [phone] line:  [phone]  [place]andra Tobin line:  [phone]  [place] line:  [phone]    IF you are not feeling well- cough, fever, shortness of breath or any flu like symptoms, contact your primary care physician or [phone] [place]    Any questions with these instructions please call Maternal Fetal Medicine nurse line today @ # [phone]

## 2020-09-08 ENCOUNTER — ULTRASOUND (OUTPATIENT)
Dept: PERINATAL CARE | Facility: OTHER | Age: 30
End: 2020-09-08
Payer: COMMERCIAL

## 2020-09-08 VITALS
HEART RATE: 109 BPM | TEMPERATURE: 98 F | DIASTOLIC BLOOD PRESSURE: 79 MMHG | SYSTOLIC BLOOD PRESSURE: 121 MMHG | WEIGHT: 242 LBS | BODY MASS INDEX: 42.88 KG/M2 | HEIGHT: 63 IN

## 2020-09-08 DIAGNOSIS — Z3A.32 32 WEEKS GESTATION OF PREGNANCY: ICD-10-CM

## 2020-09-08 DIAGNOSIS — E66.01 MATERNAL MORBID OBESITY, ANTEPARTUM, THIRD TRIMESTER (HCC): ICD-10-CM

## 2020-09-08 DIAGNOSIS — O99.213 MATERNAL MORBID OBESITY, ANTEPARTUM, THIRD TRIMESTER (HCC): ICD-10-CM

## 2020-09-08 DIAGNOSIS — IMO0002 EVALUATE ANATOMY NOT SEEN ON PRIOR SONOGRAM: ICD-10-CM

## 2020-09-08 DIAGNOSIS — Z36.89 ENCOUNTER FOR ULTRASOUND TO ASSESS FETAL GROWTH: Primary | ICD-10-CM

## 2020-09-08 PROBLEM — O99.810 ABNORMAL GLUCOSE TOLERANCE IN PREGNANCY: Status: ACTIVE | Noted: 2020-09-08

## 2020-09-08 PROCEDURE — 76816 OB US FOLLOW-UP PER FETUS: CPT | Performed by: OBSTETRICS & GYNECOLOGY

## 2020-09-08 PROCEDURE — 99212 OFFICE O/P EST SF 10 MIN: CPT | Performed by: OBSTETRICS & GYNECOLOGY

## 2020-09-08 NOTE — PROGRESS NOTES
[address]: Ms Kim Jon was seen today at 32w0d for fetal growth and followup missed anatomy ultrasound  See ultrasound report under "OB Procedures" tab    Please don't hesitate to contact our office with any concerns or questions   -Kevin Bennett MD

## 2020-09-11 ENCOUNTER — ROUTINE PRENATAL (OUTPATIENT)
Dept: OBGYN CLINIC | Facility: CLINIC | Age: 30
End: 2020-09-11
Payer: COMMERCIAL

## 2020-09-11 VITALS
WEIGHT: 241 LBS | HEIGHT: 63 IN | BODY MASS INDEX: 42.7 KG/M2 | TEMPERATURE: 97.9 F | DIASTOLIC BLOOD PRESSURE: 80 MMHG | SYSTOLIC BLOOD PRESSURE: 134 MMHG

## 2020-09-11 DIAGNOSIS — Z3A.33 33 WEEKS GESTATION OF PREGNANCY: Primary | ICD-10-CM

## 2020-09-11 PROCEDURE — 99215 OFFICE O/P EST HI 40 MIN: CPT | Performed by: OBSTETRICS & GYNECOLOGY

## 2020-09-25 ENCOUNTER — ROUTINE PRENATAL (OUTPATIENT)
Dept: OBGYN CLINIC | Facility: CLINIC | Age: 30
End: 2020-09-25
Payer: COMMERCIAL

## 2020-09-25 VITALS
SYSTOLIC BLOOD PRESSURE: 136 MMHG | DIASTOLIC BLOOD PRESSURE: 84 MMHG | BODY MASS INDEX: 43.02 KG/M2 | HEIGHT: 63 IN | TEMPERATURE: 98.1 F | WEIGHT: 242.8 LBS

## 2020-09-25 DIAGNOSIS — O34.219 PREVIOUS CESAREAN SECTION COMPLICATING PREGNANCY: Primary | ICD-10-CM

## 2020-09-25 DIAGNOSIS — Z3A.34 34 WEEKS GESTATION OF PREGNANCY: ICD-10-CM

## 2020-09-25 PROCEDURE — 99213 OFFICE O/P EST LOW 20 MIN: CPT | Performed by: OBSTETRICS & GYNECOLOGY

## 2020-10-07 ENCOUNTER — TELEPHONE (OUTPATIENT)
Dept: PERINATAL CARE | Facility: CLINIC | Age: 30
End: 2020-10-07

## 2020-10-08 ENCOUNTER — ROUTINE PRENATAL (OUTPATIENT)
Dept: PERINATAL CARE | Facility: OTHER | Age: 30
End: 2020-10-08

## 2020-10-08 ENCOUNTER — ULTRASOUND (OUTPATIENT)
Dept: PERINATAL CARE | Facility: OTHER | Age: 30
End: 2020-10-08
Payer: COMMERCIAL

## 2020-10-08 VITALS
BODY MASS INDEX: 42.63 KG/M2 | WEIGHT: 240.6 LBS | HEIGHT: 63 IN | TEMPERATURE: 95.9 F | SYSTOLIC BLOOD PRESSURE: 131 MMHG | HEART RATE: 100 BPM | DIASTOLIC BLOOD PRESSURE: 72 MMHG

## 2020-10-08 DIAGNOSIS — O99.213 MATERNAL MORBID OBESITY, ANTEPARTUM, THIRD TRIMESTER (HCC): ICD-10-CM

## 2020-10-08 DIAGNOSIS — E66.01 MATERNAL MORBID OBESITY, ANTEPARTUM, THIRD TRIMESTER (HCC): ICD-10-CM

## 2020-10-08 DIAGNOSIS — Z33.1 PREGNANT STATE, INCIDENTAL: Primary | ICD-10-CM

## 2020-10-08 DIAGNOSIS — Z3A.36 36 WEEKS GESTATION OF PREGNANCY: Primary | ICD-10-CM

## 2020-10-08 DIAGNOSIS — O10.913 MATERNAL CHRONIC HYPERTENSION, THIRD TRIMESTER: ICD-10-CM

## 2020-10-08 PROCEDURE — 76816 OB US FOLLOW-UP PER FETUS: CPT | Performed by: OBSTETRICS & GYNECOLOGY

## 2020-10-08 PROCEDURE — 59025 FETAL NON-STRESS TEST: CPT | Performed by: OBSTETRICS & GYNECOLOGY

## 2020-10-08 PROCEDURE — NC001 PR NO CHARGE

## 2020-10-08 NOTE — PATIENT INSTRUCTIONS
Kick Counts in Pregnancy   AMBULATORY CARE:   Kick counts  measure how much your baby is moving in your womb  A kick from your baby can be felt as a twist, turn, swish, roll, or jab  It is common to feel your baby kicking at 26 to 28 weeks of pregnancy  You may feel your baby kick as early as 20 weeks of pregnancy  Seek care immediately if:   · You feel your baby kick less as the day goes on      · You do not feel any kicks in a day  Contact your healthcare provider if:   · You feel a change in the number of kicks or movements of your baby  · You feel fewer than 10 kicks within 2 hours  · You have questions or concerns about your baby's movements  Why measure kick counts:  Your baby's movement may provide information about your baby's health  He may move less, or not at all, if there are problems  He may move less if he does not have enough room to grow in your uterus (womb)  He may also move less if he is not getting enough oxygen or nutrition from the placenta  Tell your healthcare provider as soon as you feel a change in your baby's movements  Problems that are found earlier are easier to treat  When to measure kick counts:   · Measure kick counts at the same time every day  · Measure kick counts when your baby is awake and most active  Your baby may be most active in the evening  · Measure kick counts after a meal or snack  Your baby may be more active after you eat  Wait 2 hours after you drink liquids that contain caffeine  Caffeine can make your baby more active than usual     · You should not smoke while you are pregnant  Smoking increases the risk of health problems for you and for your baby during your pregnancy  If you do smoke, wait 2 hours to measure kick counts  Nicotine can make your baby more active than usual   How to measure kick counts:  Check that your baby is awake before you measure kick counts   You can wake up your baby by lightly pushing on your belly, walking, or drinking something cold  Your healthcare provider may tell you different ways to measure kick counts  He may tell you to do the following:  · Use a chart or clock to keep track of the time you start and finish counting  · Sit in a chair or lie on your left side  · Place your hands on the largest part of your belly  · Count until you reach 10 kicks  Write down how much time it takes to count 10 kicks  · It may take 30 minutes to 2 hours to count 10 kicks  It should not take more than 2 hours to count 10 kicks  If you do not feel 10 kicks within 2 hoursNonstress Test for Pregnancy   WHAT YOU NEED TO KNOW:   What do I need to know about a nonstress test?  A nonstress test measures your baby's heart rate and movements  Nonstress means that no stress will be placed on your baby during the test    How do I prepare for a nonstress test?  Your healthcare provider will talk to you about how to prepare for this test  He may tell you to eat and drink plenty of fluids before your test  If you smoke, you may be asked not to smoke within 2 hours before the test  He will also tell you what medicines to take or not take on the day of your test    What will happen during a nonstress test?  You may be asked to lie down or recline back for the test on a bed  One or two belts with sensors will be placed around your abdomen  Your baby's heart rate will be recorded with a machine  If your baby does not move, your baby may be asleep  Your healthcare provider may make a noise near your abdomen to try to wake your baby  The test usually takes about 20 minutes, but can take longer if your baby needs to be awakened  What do I need to know about the test results? Your baby will be expected to move at least twice for a certain amount of time  Your baby's heart rate will be expected to go up by a certain number of beats per minute during movement   If your baby does not move as expected, the test may need to be repeated or you may need other tests  CARE AGREEMENT:   You have the right to help plan your care  Learn about your health condition and how it may be treated  Discuss treatment options with your caregivers to decide what care you want to receive  You always have the right to refuse treatment  The above information is an  only  It is not intended as medical advice for individual conditions or treatments  Talk to your doctor, nurse or pharmacist before following any medical regimen to see if it is safe and effective for you  © 2017 [employer] Information is for End User's use only and may not be sold, redistributed or otherwise used for commercial purposes  All illustrations and images included in CareNotes® are the copyrighted property of [employer]  or [employer] [facility]  ·   Your baby can sleep for up to 40 minutes at one time  Follow up with your healthcare provider as directed:  Write down your questions so you remember to ask them during your visits  © 2017 [employer] Information is for End User's use only and may not be sold, redistributed or otherwise used for commercial purposes  All illustrations and images included in CareNotes® are the copyrighted property of [employer]  or [employer] [facility]  The above information is an  only  It is not intended as medical advice for individual conditions or treatments  Talk to your doctor, nurse or pharmacist before following any medical regimen to see if it is safe and effective for you

## 2020-10-08 NOTE — LETTER
NST sleeve cover sheet    Patient name: Roland Frank  : 1990  MRN: [id number]    ART: Estimated Date of Delivery: 11/3/20    Obstetrician: _______________________________    Reason(s) for testing:  ______________________BMI > 40____________________      Testing frequency:    ___ 2x/wk  ___ 1x/wk  ___ Dopplers  ___ BPP?       Last growth scan: __________________________________________

## 2020-10-09 ENCOUNTER — ROUTINE PRENATAL (OUTPATIENT)
Dept: OBGYN CLINIC | Facility: CLINIC | Age: 30
End: 2020-10-09
Payer: COMMERCIAL

## 2020-10-09 VITALS
HEIGHT: 63 IN | TEMPERATURE: 97.7 F | BODY MASS INDEX: 43.3 KG/M2 | SYSTOLIC BLOOD PRESSURE: 136 MMHG | DIASTOLIC BLOOD PRESSURE: 80 MMHG | WEIGHT: 244.4 LBS

## 2020-10-09 DIAGNOSIS — O10.913 MATERNAL CHRONIC HYPERTENSION, THIRD TRIMESTER: ICD-10-CM

## 2020-10-09 DIAGNOSIS — Z34.83 PRENATAL CARE, SUBSEQUENT PREGNANCY, THIRD TRIMESTER: ICD-10-CM

## 2020-10-09 DIAGNOSIS — O34.219 PREVIOUS CESAREAN SECTION COMPLICATING PREGNANCY: Primary | ICD-10-CM

## 2020-10-09 DIAGNOSIS — Z23 NEED FOR TDAP VACCINATION: ICD-10-CM

## 2020-10-09 PROCEDURE — 87653 STREP B DNA AMP PROBE: CPT | Performed by: OBSTETRICS & GYNECOLOGY

## 2020-10-09 PROCEDURE — 90715 TDAP VACCINE 7 YRS/> IM: CPT | Performed by: OBSTETRICS & GYNECOLOGY

## 2020-10-09 PROCEDURE — 90471 IMMUNIZATION ADMIN: CPT | Performed by: OBSTETRICS & GYNECOLOGY

## 2020-10-09 PROCEDURE — 99213 OFFICE O/P EST LOW 20 MIN: CPT | Performed by: OBSTETRICS & GYNECOLOGY

## 2020-10-11 LAB — GP B STREP DNA SPEC QL NAA+PROBE: NORMAL

## 2020-10-14 ENCOUNTER — TELEPHONE (OUTPATIENT)
Dept: PERINATAL CARE | Facility: OTHER | Age: 30
End: 2020-10-14

## 2020-10-15 ENCOUNTER — ULTRASOUND (OUTPATIENT)
Dept: PERINATAL CARE | Facility: OTHER | Age: 30
End: 2020-10-15
Payer: COMMERCIAL

## 2020-10-15 VITALS
TEMPERATURE: 96.1 F | BODY MASS INDEX: 43.02 KG/M2 | SYSTOLIC BLOOD PRESSURE: 137 MMHG | HEART RATE: 110 BPM | HEIGHT: 63 IN | WEIGHT: 242.8 LBS | DIASTOLIC BLOOD PRESSURE: 65 MMHG

## 2020-10-15 DIAGNOSIS — O10.913 MATERNAL CHRONIC HYPERTENSION, THIRD TRIMESTER: Primary | ICD-10-CM

## 2020-10-15 DIAGNOSIS — Z3A.37 37 WEEKS GESTATION OF PREGNANCY: ICD-10-CM

## 2020-10-15 PROCEDURE — 76815 OB US LIMITED FETUS(S): CPT | Performed by: OBSTETRICS & GYNECOLOGY

## 2020-10-15 PROCEDURE — 59025 FETAL NON-STRESS TEST: CPT | Performed by: OBSTETRICS & GYNECOLOGY

## 2020-10-16 ENCOUNTER — ROUTINE PRENATAL (OUTPATIENT)
Dept: OBGYN CLINIC | Facility: CLINIC | Age: 30
End: 2020-10-16
Payer: COMMERCIAL

## 2020-10-16 VITALS
WEIGHT: 243.6 LBS | DIASTOLIC BLOOD PRESSURE: 82 MMHG | SYSTOLIC BLOOD PRESSURE: 138 MMHG | TEMPERATURE: 96.2 F | HEIGHT: 63 IN | BODY MASS INDEX: 43.16 KG/M2

## 2020-10-16 DIAGNOSIS — Z3A.37 PREGNANCY WITH 37 WEEKS COMPLETED GESTATION: Primary | ICD-10-CM

## 2020-10-16 PROCEDURE — 99215 OFFICE O/P EST HI 40 MIN: CPT | Performed by: OBSTETRICS & GYNECOLOGY

## 2020-10-21 ENCOUNTER — TELEPHONE (OUTPATIENT)
Dept: PERINATAL CARE | Facility: CLINIC | Age: 30
End: 2020-10-21

## 2020-10-22 ENCOUNTER — ULTRASOUND (OUTPATIENT)
Dept: PERINATAL CARE | Facility: OTHER | Age: 30
End: 2020-10-22
Payer: COMMERCIAL

## 2020-10-22 VITALS
BODY MASS INDEX: 43.23 KG/M2 | HEART RATE: 106 BPM | SYSTOLIC BLOOD PRESSURE: 122 MMHG | DIASTOLIC BLOOD PRESSURE: 82 MMHG | HEIGHT: 63 IN | WEIGHT: 244 LBS | TEMPERATURE: 98 F

## 2020-10-22 DIAGNOSIS — O99.213 MATERNAL MORBID OBESITY, ANTEPARTUM, THIRD TRIMESTER (HCC): Primary | ICD-10-CM

## 2020-10-22 DIAGNOSIS — E66.01 MATERNAL MORBID OBESITY, ANTEPARTUM, THIRD TRIMESTER (HCC): Primary | ICD-10-CM

## 2020-10-22 PROCEDURE — 76815 OB US LIMITED FETUS(S): CPT | Performed by: OBSTETRICS & GYNECOLOGY

## 2020-10-22 PROCEDURE — 59025 FETAL NON-STRESS TEST: CPT | Performed by: OBSTETRICS & GYNECOLOGY

## 2020-10-23 ENCOUNTER — ROUTINE PRENATAL (OUTPATIENT)
Dept: OBGYN CLINIC | Facility: CLINIC | Age: 30
End: 2020-10-23
Payer: COMMERCIAL

## 2020-10-23 VITALS
SYSTOLIC BLOOD PRESSURE: 144 MMHG | WEIGHT: 246 LBS | DIASTOLIC BLOOD PRESSURE: 86 MMHG | TEMPERATURE: 96.9 F | HEIGHT: 63 IN | BODY MASS INDEX: 43.59 KG/M2

## 2020-10-23 DIAGNOSIS — Z34.83 PRENATAL CARE, SUBSEQUENT PREGNANCY IN THIRD TRIMESTER: Primary | ICD-10-CM

## 2020-10-23 DIAGNOSIS — Z23 NEED FOR INFLUENZA VACCINATION: ICD-10-CM

## 2020-10-23 PROCEDURE — 90471 IMMUNIZATION ADMIN: CPT | Performed by: OBSTETRICS & GYNECOLOGY

## 2020-10-23 PROCEDURE — 99215 OFFICE O/P EST HI 40 MIN: CPT | Performed by: OBSTETRICS & GYNECOLOGY

## 2020-10-23 PROCEDURE — 90686 IIV4 VACC NO PRSV 0.5 ML IM: CPT | Performed by: OBSTETRICS & GYNECOLOGY

## 2020-10-28 ENCOUNTER — TELEPHONE (OUTPATIENT)
Dept: PERINATAL CARE | Facility: CLINIC | Age: 30
End: 2020-10-28

## 2020-10-29 ENCOUNTER — ANESTHESIA EVENT (INPATIENT)
Dept: LABOR AND DELIVERY | Facility: HOSPITAL | Age: 30
DRG: 539 | End: 2020-10-29
Payer: COMMERCIAL

## 2020-10-29 PROCEDURE — 99024 POSTOP FOLLOW-UP VISIT: CPT | Performed by: OBSTETRICS & GYNECOLOGY

## 2020-10-30 ENCOUNTER — HOSPITAL ENCOUNTER (INPATIENT)
Facility: HOSPITAL | Age: 30
LOS: 2 days | Discharge: HOME | DRG: 539 | End: 2020-11-01
Attending: OBSTETRICS & GYNECOLOGY | Admitting: OBSTETRICS & GYNECOLOGY
Payer: COMMERCIAL

## 2020-10-30 ENCOUNTER — ANESTHESIA (INPATIENT)
Dept: LABOR AND DELIVERY | Facility: HOSPITAL | Age: 30
DRG: 539 | End: 2020-10-30
Payer: COMMERCIAL

## 2020-10-30 VITALS — HEART RATE: 93 BPM

## 2020-10-30 DIAGNOSIS — Z98.891 S/P CESAREAN SECTION: ICD-10-CM

## 2020-10-30 DIAGNOSIS — O34.219 PREVIOUS CESAREAN SECTION COMPLICATING PREGNANCY: Primary | ICD-10-CM

## 2020-10-30 PROBLEM — Z90.79 STATUS POST BILATERAL SALPINGECTOMY: Status: ACTIVE | Noted: 2020-10-30

## 2020-10-30 LAB
ABO GROUP BLD: NORMAL
BASE EXCESS BLDCOV CALC-SCNC: -4.6 MMOL/L (ref 1–9)
BASOPHILS # BLD AUTO: 0.04 THOUSANDS/ΜL (ref 0–0.1)
BASOPHILS NFR BLD AUTO: 0 % (ref 0–1)
BLD GP AB SCN SERPL QL: NEGATIVE
EOSINOPHIL # BLD AUTO: 0.21 THOUSAND/ΜL (ref 0–0.61)
EOSINOPHIL NFR BLD AUTO: 2 % (ref 0–6)
ERYTHROCYTE [DISTWIDTH] IN BLOOD BY AUTOMATED COUNT: 14.9 % (ref 11.6–15.1)
HBV SURFACE AG SER QL: NORMAL
HCO3 BLDCOV-SCNC: 21.7 MMOL/L (ref 12.2–28.6)
HCT VFR BLD AUTO: 31.7 % (ref 34.8–46.1)
HCV AB SER QL: NORMAL
HGB BLD-MCNC: 10 G/DL (ref 11.5–15.4)
HIV 1+2 AB+HIV1 P24 AG SERPL QL IA: NORMAL
HIV1 P24 AG SER QL: NORMAL
HOLD SPECIMEN: NORMAL
IMM GRANULOCYTES # BLD AUTO: 0.12 THOUSAND/UL (ref 0–0.2)
IMM GRANULOCYTES NFR BLD AUTO: 1 % (ref 0–2)
LYMPHOCYTES # BLD AUTO: 1.55 THOUSANDS/ΜL (ref 0.6–4.47)
LYMPHOCYTES NFR BLD AUTO: 16 % (ref 14–44)
MCH RBC QN AUTO: 24.9 PG (ref 26.8–34.3)
MCHC RBC AUTO-ENTMCNC: 31.5 G/DL (ref 31.4–37.4)
MCV RBC AUTO: 79 FL (ref 82–98)
MONOCYTES # BLD AUTO: 0.87 THOUSAND/ΜL (ref 0.17–1.22)
MONOCYTES NFR BLD AUTO: 9 % (ref 4–12)
NEUTROPHILS # BLD AUTO: 6.74 THOUSANDS/ΜL (ref 1.85–7.62)
NEUTS SEG NFR BLD AUTO: 72 % (ref 43–75)
NRBC BLD AUTO-RTO: 0 /100 WBCS
OXYHGB MFR BLDCOV: 36.6 %
PCO2 BLDCOV: 44.5 MM HG (ref 27–43)
PH BLDCOV: 7.31 [PH] (ref 7.19–7.49)
PLATELET # BLD AUTO: 238 THOUSANDS/UL (ref 149–390)
PMV BLD AUTO: 10.9 FL (ref 8.9–12.7)
PO2 BLDCOV: 20.2 MM HG (ref 15–45)
RBC # BLD AUTO: 4.01 MILLION/UL (ref 3.81–5.12)
RH BLD: POSITIVE
RPR SER QL: NORMAL
RUBV IGG SERPL IA-ACNC: 156.9 IU/ML
SAO2 % BLDCOV: 7.3 ML/DL
SPECIMEN EXPIRATION DATE: NORMAL
WBC # BLD AUTO: 9.53 THOUSAND/UL (ref 4.31–10.16)

## 2020-10-30 PROCEDURE — 86803 HEPATITIS C AB TEST: CPT | Performed by: OBSTETRICS & GYNECOLOGY

## 2020-10-30 PROCEDURE — 99024 POSTOP FOLLOW-UP VISIT: CPT | Performed by: OBSTETRICS & GYNECOLOGY

## 2020-10-30 PROCEDURE — 86850 RBC ANTIBODY SCREEN: CPT | Performed by: OBSTETRICS & GYNECOLOGY

## 2020-10-30 PROCEDURE — 59514 CESAREAN DELIVERY ONLY: CPT | Performed by: OBSTETRICS & GYNECOLOGY

## 2020-10-30 PROCEDURE — 87340 HEPATITIS B SURFACE AG IA: CPT | Performed by: OBSTETRICS & GYNECOLOGY

## 2020-10-30 PROCEDURE — 0UL70ZZ OCCLUSION OF BILATERAL FALLOPIAN TUBES, OPEN APPROACH: ICD-10-PCS | Performed by: OBSTETRICS & GYNECOLOGY

## 2020-10-30 PROCEDURE — 86592 SYPHILIS TEST NON-TREP QUAL: CPT | Performed by: OBSTETRICS & GYNECOLOGY

## 2020-10-30 PROCEDURE — 88302 TISSUE EXAM BY PATHOLOGIST: CPT | Performed by: PATHOLOGY

## 2020-10-30 PROCEDURE — 58611 LIGATE OVIDUCT(S) ADD-ON: CPT | Performed by: OBSTETRICS & GYNECOLOGY

## 2020-10-30 PROCEDURE — 85025 COMPLETE CBC W/AUTO DIFF WBC: CPT | Performed by: OBSTETRICS & GYNECOLOGY

## 2020-10-30 PROCEDURE — 87806 HIV AG W/HIV1&2 ANTB W/OPTIC: CPT | Performed by: OBSTETRICS & GYNECOLOGY

## 2020-10-30 PROCEDURE — 82805 BLOOD GASES W/O2 SATURATION: CPT | Performed by: OBSTETRICS & GYNECOLOGY

## 2020-10-30 PROCEDURE — 86762 RUBELLA ANTIBODY: CPT | Performed by: OBSTETRICS & GYNECOLOGY

## 2020-10-30 PROCEDURE — 86901 BLOOD TYPING SEROLOGIC RH(D): CPT | Performed by: OBSTETRICS & GYNECOLOGY

## 2020-10-30 PROCEDURE — 86900 BLOOD TYPING SEROLOGIC ABO: CPT | Performed by: OBSTETRICS & GYNECOLOGY

## 2020-10-30 RX ORDER — OXYCODONE HYDROCHLORIDE 5 MG/1
5 TABLET ORAL EVERY 4 HOURS PRN
Status: DISCONTINUED | OUTPATIENT
Start: 2020-10-31 | End: 2020-11-01 | Stop reason: HOSPADM

## 2020-10-30 RX ORDER — ONDANSETRON 2 MG/ML
INJECTION INTRAMUSCULAR; INTRAVENOUS AS NEEDED
Status: DISCONTINUED | OUTPATIENT
Start: 2020-10-30 | End: 2020-10-30

## 2020-10-30 RX ORDER — ONDANSETRON 2 MG/ML
INJECTION INTRAMUSCULAR; INTRAVENOUS
Status: COMPLETED
Start: 2020-10-30 | End: 2020-10-30

## 2020-10-30 RX ORDER — GLYCOPYRROLATE 0.2 MG/ML
INJECTION INTRAMUSCULAR; INTRAVENOUS
Status: COMPLETED
Start: 2020-10-30 | End: 2020-10-30

## 2020-10-30 RX ORDER — KETOROLAC TROMETHAMINE 30 MG/ML
INJECTION, SOLUTION INTRAMUSCULAR; INTRAVENOUS
Status: COMPLETED
Start: 2020-10-30 | End: 2020-10-30

## 2020-10-30 RX ORDER — ONDANSETRON 2 MG/ML
4 INJECTION INTRAMUSCULAR; INTRAVENOUS EVERY 4 HOURS PRN
Status: ACTIVE | OUTPATIENT
Start: 2020-10-30 | End: 2020-10-31

## 2020-10-30 RX ORDER — SODIUM CHLORIDE, SODIUM LACTATE, POTASSIUM CHLORIDE, CALCIUM CHLORIDE 600; 310; 30; 20 MG/100ML; MG/100ML; MG/100ML; MG/100ML
125 INJECTION, SOLUTION INTRAVENOUS CONTINUOUS
Status: DISCONTINUED | OUTPATIENT
Start: 2020-10-30 | End: 2020-10-30

## 2020-10-30 RX ORDER — PHENYLEPHRINE HYDROCHLORIDE 10 MG/ML
INJECTION INTRAVENOUS
Status: DISPENSED
Start: 2020-10-30 | End: 2020-10-30

## 2020-10-30 RX ORDER — FENTANYL CITRATE 50 UG/ML
INJECTION, SOLUTION INTRAMUSCULAR; INTRAVENOUS AS NEEDED
Status: DISCONTINUED | OUTPATIENT
Start: 2020-10-30 | End: 2020-10-30

## 2020-10-30 RX ORDER — ACETAMINOPHEN 325 MG/1
650 TABLET ORAL EVERY 4 HOURS PRN
Status: DISCONTINUED | OUTPATIENT
Start: 2020-10-30 | End: 2020-11-01 | Stop reason: HOSPADM

## 2020-10-30 RX ORDER — MORPHINE SULFATE 0.5 MG/ML
INJECTION, SOLUTION EPIDURAL; INTRATHECAL; INTRAVENOUS
Status: COMPLETED
Start: 2020-10-30 | End: 2020-10-30

## 2020-10-30 RX ORDER — BUPIVACAINE HYDROCHLORIDE 7.5 MG/ML
INJECTION, SOLUTION INTRASPINAL AS NEEDED
Status: DISCONTINUED | OUTPATIENT
Start: 2020-10-30 | End: 2020-10-30

## 2020-10-30 RX ORDER — MORPHINE SULFATE 0.5 MG/ML
INJECTION, SOLUTION EPIDURAL; INTRATHECAL; INTRAVENOUS AS NEEDED
Status: DISCONTINUED | OUTPATIENT
Start: 2020-10-30 | End: 2020-10-30

## 2020-10-30 RX ORDER — OXYTOCIN/RINGER'S LACTATE 30/500 ML
62.5 PLASTIC BAG, INJECTION (ML) INTRAVENOUS ONCE
Status: COMPLETED | OUTPATIENT
Start: 2020-10-30 | End: 2020-10-30

## 2020-10-30 RX ORDER — KETOROLAC TROMETHAMINE 30 MG/ML
30 INJECTION, SOLUTION INTRAMUSCULAR; INTRAVENOUS EVERY 6 HOURS SCHEDULED
Status: COMPLETED | OUTPATIENT
Start: 2020-10-30 | End: 2020-10-31

## 2020-10-30 RX ORDER — HYDROMORPHONE HCL/PF 1 MG/ML
0.5 SYRINGE (ML) INJECTION EVERY 2 HOUR PRN
Status: DISCONTINUED | OUTPATIENT
Start: 2020-10-30 | End: 2020-11-01 | Stop reason: HOSPADM

## 2020-10-30 RX ORDER — DIPHENHYDRAMINE HCL 25 MG
25 TABLET ORAL EVERY 6 HOURS PRN
Status: DISCONTINUED | OUTPATIENT
Start: 2020-10-31 | End: 2020-11-01 | Stop reason: HOSPADM

## 2020-10-30 RX ORDER — SODIUM CHLORIDE, SODIUM LACTATE, POTASSIUM CHLORIDE, CALCIUM CHLORIDE 600; 310; 30; 20 MG/100ML; MG/100ML; MG/100ML; MG/100ML
125 INJECTION, SOLUTION INTRAVENOUS CONTINUOUS
Status: DISCONTINUED | OUTPATIENT
Start: 2020-10-30 | End: 2020-11-01 | Stop reason: HOSPADM

## 2020-10-30 RX ORDER — METOCLOPRAMIDE HYDROCHLORIDE 5 MG/ML
INJECTION INTRAMUSCULAR; INTRAVENOUS
Status: DISPENSED
Start: 2020-10-30 | End: 2020-10-30

## 2020-10-30 RX ORDER — PROMETHAZINE HYDROCHLORIDE 25 MG/ML
12.5 INJECTION, SOLUTION INTRAMUSCULAR; INTRAVENOUS ONCE
Status: COMPLETED | OUTPATIENT
Start: 2020-10-30 | End: 2020-10-30

## 2020-10-30 RX ORDER — DIPHENHYDRAMINE HYDROCHLORIDE 50 MG/ML
25 INJECTION INTRAMUSCULAR; INTRAVENOUS EVERY 6 HOURS PRN
Status: ACTIVE | OUTPATIENT
Start: 2020-10-30 | End: 2020-10-31

## 2020-10-30 RX ORDER — METOCLOPRAMIDE HYDROCHLORIDE 5 MG/ML
5 INJECTION INTRAMUSCULAR; INTRAVENOUS EVERY 6 HOURS PRN
Status: ACTIVE | OUTPATIENT
Start: 2020-10-30 | End: 2020-10-31

## 2020-10-30 RX ORDER — OXYTOCIN/RINGER'S LACTATE 30/500 ML
PLASTIC BAG, INJECTION (ML) INTRAVENOUS CONTINUOUS PRN
Status: DISCONTINUED | OUTPATIENT
Start: 2020-10-30 | End: 2020-10-30

## 2020-10-30 RX ORDER — NALOXONE HYDROCHLORIDE 0.4 MG/ML
0.1 INJECTION, SOLUTION INTRAMUSCULAR; INTRAVENOUS; SUBCUTANEOUS
Status: ACTIVE | OUTPATIENT
Start: 2020-10-30 | End: 2020-10-31

## 2020-10-30 RX ORDER — ONDANSETRON 2 MG/ML
4 INJECTION INTRAMUSCULAR; INTRAVENOUS EVERY 8 HOURS PRN
Status: DISCONTINUED | OUTPATIENT
Start: 2020-10-31 | End: 2020-11-01 | Stop reason: HOSPADM

## 2020-10-30 RX ORDER — CALCIUM CARBONATE 200(500)MG
1000 TABLET,CHEWABLE ORAL DAILY PRN
Status: DISCONTINUED | OUTPATIENT
Start: 2020-10-30 | End: 2020-11-01 | Stop reason: HOSPADM

## 2020-10-30 RX ORDER — FENTANYL CITRATE/PF 50 MCG/ML
25 SYRINGE (ML) INJECTION
Status: DISCONTINUED | OUTPATIENT
Start: 2020-10-30 | End: 2020-11-01 | Stop reason: HOSPADM

## 2020-10-30 RX ORDER — METOCLOPRAMIDE HYDROCHLORIDE 5 MG/ML
INJECTION INTRAMUSCULAR; INTRAVENOUS AS NEEDED
Status: DISCONTINUED | OUTPATIENT
Start: 2020-10-30 | End: 2020-10-30

## 2020-10-30 RX ORDER — OXYCODONE HYDROCHLORIDE 5 MG/1
10 TABLET ORAL EVERY 4 HOURS PRN
Status: DISCONTINUED | OUTPATIENT
Start: 2020-10-31 | End: 2020-11-01 | Stop reason: HOSPADM

## 2020-10-30 RX ORDER — PROMETHAZINE HYDROCHLORIDE 25 MG/ML
25 INJECTION, SOLUTION INTRAMUSCULAR; INTRAVENOUS ONCE
Status: DISCONTINUED | OUTPATIENT
Start: 2020-10-30 | End: 2020-10-30

## 2020-10-30 RX ORDER — HYDROMORPHONE HCL/PF 1 MG/ML
0.5 SYRINGE (ML) INJECTION
Status: DISCONTINUED | OUTPATIENT
Start: 2020-10-30 | End: 2020-11-01 | Stop reason: HOSPADM

## 2020-10-30 RX ORDER — FENTANYL CITRATE 50 UG/ML
INJECTION, SOLUTION INTRAMUSCULAR; INTRAVENOUS
Status: DISPENSED
Start: 2020-10-30 | End: 2020-10-30

## 2020-10-30 RX ORDER — OXYTOCIN/RINGER'S LACTATE 30/500 ML
PLASTIC BAG, INJECTION (ML) INTRAVENOUS
Status: COMPLETED
Start: 2020-10-30 | End: 2020-10-30

## 2020-10-30 RX ORDER — DOCUSATE SODIUM 100 MG/1
100 CAPSULE, LIQUID FILLED ORAL 2 TIMES DAILY
Status: DISCONTINUED | OUTPATIENT
Start: 2020-10-30 | End: 2020-11-01 | Stop reason: HOSPADM

## 2020-10-30 RX ORDER — MIDAZOLAM HYDROCHLORIDE 2 MG/2ML
INJECTION, SOLUTION INTRAMUSCULAR; INTRAVENOUS
Status: DISPENSED
Start: 2020-10-30 | End: 2020-10-30

## 2020-10-30 RX ORDER — GLYCOPYRROLATE 0.2 MG/ML
INJECTION INTRAMUSCULAR; INTRAVENOUS AS NEEDED
Status: DISCONTINUED | OUTPATIENT
Start: 2020-10-30 | End: 2020-10-30

## 2020-10-30 RX ORDER — EPHEDRINE SULFATE 50 MG/ML
INJECTION INTRAVENOUS
Status: DISCONTINUED
Start: 2020-10-30 | End: 2020-10-30 | Stop reason: WASHOUT

## 2020-10-30 RX ORDER — ACETAMINOPHEN 325 MG/1
650 TABLET ORAL EVERY 6 HOURS
Status: DISCONTINUED | OUTPATIENT
Start: 2020-10-30 | End: 2020-11-01 | Stop reason: HOSPADM

## 2020-10-30 RX ORDER — DEXAMETHASONE SODIUM PHOSPHATE 4 MG/ML
INJECTION, SOLUTION INTRA-ARTICULAR; INTRALESIONAL; INTRAMUSCULAR; INTRAVENOUS; SOFT TISSUE AS NEEDED
Status: DISCONTINUED | OUTPATIENT
Start: 2020-10-30 | End: 2020-10-30

## 2020-10-30 RX ORDER — MIDAZOLAM HYDROCHLORIDE 2 MG/2ML
INJECTION, SOLUTION INTRAMUSCULAR; INTRAVENOUS AS NEEDED
Status: DISCONTINUED | OUTPATIENT
Start: 2020-10-30 | End: 2020-10-30

## 2020-10-30 RX ADMIN — METOCLOPRAMIDE HYDROCHLORIDE 10 MG: 5 INJECTION INTRAMUSCULAR; INTRAVENOUS at 09:17

## 2020-10-30 RX ADMIN — SODIUM CHLORIDE, SODIUM LACTATE, POTASSIUM CHLORIDE, AND CALCIUM CHLORIDE 125 ML/HR: .6; .31; .03; .02 INJECTION, SOLUTION INTRAVENOUS at 14:34

## 2020-10-30 RX ADMIN — FENTANYL CITRATE 50 MCG: 50 INJECTION INTRAMUSCULAR; INTRAVENOUS at 09:31

## 2020-10-30 RX ADMIN — MIDAZOLAM HYDROCHLORIDE 2 MG: 1 INJECTION, SOLUTION INTRAMUSCULAR; INTRAVENOUS at 09:34

## 2020-10-30 RX ADMIN — SODIUM CHLORIDE, SODIUM LACTATE, POTASSIUM CHLORIDE, AND CALCIUM CHLORIDE 125 ML/HR: .6; .31; .03; .02 INJECTION, SOLUTION INTRAVENOUS at 07:33

## 2020-10-30 RX ADMIN — KETOROLAC TROMETHAMINE 30 MG: 30 INJECTION, SOLUTION INTRAMUSCULAR at 09:30

## 2020-10-30 RX ADMIN — PHENYLEPHRINE HYDROCHLORIDE 50 MCG/MIN: 10 INJECTION INTRAVENOUS at 08:38

## 2020-10-30 RX ADMIN — ACETAMINOPHEN 650 MG: 325 TABLET, FILM COATED ORAL at 22:31

## 2020-10-30 RX ADMIN — ONDANSETRON 4 MG: 2 INJECTION INTRAMUSCULAR; INTRAVENOUS at 09:02

## 2020-10-30 RX ADMIN — SODIUM CHLORIDE, SODIUM LACTATE, POTASSIUM CHLORIDE, AND CALCIUM CHLORIDE 125 ML/HR: .6; .31; .03; .02 INJECTION, SOLUTION INTRAVENOUS at 22:30

## 2020-10-30 RX ADMIN — SODIUM CHLORIDE, SODIUM LACTATE, POTASSIUM CHLORIDE, AND CALCIUM CHLORIDE: .6; .31; .03; .02 INJECTION, SOLUTION INTRAVENOUS at 09:19

## 2020-10-30 RX ADMIN — FENTANYL CITRATE 25 MCG: 50 INJECTION INTRAMUSCULAR; INTRAVENOUS at 09:34

## 2020-10-30 RX ADMIN — ACETAMINOPHEN 650 MG: 325 TABLET, FILM COATED ORAL at 17:01

## 2020-10-30 RX ADMIN — Medication 62.5 MILLI-UNITS/MIN: at 12:20

## 2020-10-30 RX ADMIN — BUPIVACAINE HYDROCHLORIDE IN DEXTROSE 1.6 ML: 7.5 INJECTION, SOLUTION SUBARACHNOID at 08:44

## 2020-10-30 RX ADMIN — ONDANSETRON 4 MG: 2 INJECTION INTRAMUSCULAR; INTRAVENOUS at 10:35

## 2020-10-30 RX ADMIN — DEXAMETHASONE SODIUM PHOSPHATE 4 MG: 4 INJECTION, SOLUTION INTRAMUSCULAR; INTRAVENOUS at 09:11

## 2020-10-30 RX ADMIN — ENOXAPARIN SODIUM 40 MG: 40 INJECTION SUBCUTANEOUS at 22:30

## 2020-10-30 RX ADMIN — SODIUM CHLORIDE, SODIUM LACTATE, POTASSIUM CHLORIDE, AND CALCIUM CHLORIDE 1000 ML: .6; .31; .03; .02 INJECTION, SOLUTION INTRAVENOUS at 07:12

## 2020-10-30 RX ADMIN — GLYCOPYRROLATE 0.2 MG: 0.2 INJECTION, SOLUTION INTRAMUSCULAR; INTRAVENOUS at 09:14

## 2020-10-30 RX ADMIN — FENTANYL CITRATE 25 MCG: 50 INJECTION INTRAMUSCULAR; INTRAVENOUS at 09:37

## 2020-10-30 RX ADMIN — Medication 250 MILLI-UNITS/MIN: at 09:06

## 2020-10-30 RX ADMIN — MORPHINE SULFATE 0.2 MG: 0.5 INJECTION, SOLUTION EPIDURAL; INTRATHECAL; INTRAVENOUS at 08:44

## 2020-10-30 RX ADMIN — DOCUSATE SODIUM 100 MG: 100 CAPSULE, LIQUID FILLED ORAL at 17:01

## 2020-10-30 RX ADMIN — PROMETHAZINE HYDROCHLORIDE 12.5 MG: 25 INJECTION INTRAMUSCULAR; INTRAVENOUS at 11:33

## 2020-10-30 RX ADMIN — KETOROLAC TROMETHAMINE 30 MG: 30 INJECTION, SOLUTION INTRAMUSCULAR at 17:01

## 2020-10-30 RX ADMIN — Medication 2000 MG: at 08:41

## 2020-10-31 LAB
ERYTHROCYTE [DISTWIDTH] IN BLOOD BY AUTOMATED COUNT: 14.9 % (ref 11.6–15.1)
HCT VFR BLD AUTO: 24 % (ref 34.8–46.1)
HGB BLD-MCNC: 7.4 G/DL (ref 11.5–15.4)
MCH RBC QN AUTO: 24.9 PG (ref 26.8–34.3)
MCHC RBC AUTO-ENTMCNC: 30.8 G/DL (ref 31.4–37.4)
MCV RBC AUTO: 81 FL (ref 82–98)
PLATELET # BLD AUTO: 205 THOUSANDS/UL (ref 149–390)
PMV BLD AUTO: 10.7 FL (ref 8.9–12.7)
RBC # BLD AUTO: 2.97 MILLION/UL (ref 3.81–5.12)
WBC # BLD AUTO: 11.39 THOUSAND/UL (ref 4.31–10.16)

## 2020-10-31 PROCEDURE — 85027 COMPLETE CBC AUTOMATED: CPT | Performed by: OBSTETRICS & GYNECOLOGY

## 2020-10-31 RX ORDER — SIMETHICONE 80 MG
80 TABLET,CHEWABLE ORAL EVERY 6 HOURS PRN
Status: DISCONTINUED | OUTPATIENT
Start: 2020-10-31 | End: 2020-11-01 | Stop reason: HOSPADM

## 2020-10-31 RX ORDER — IBUPROFEN 600 MG/1
600 TABLET ORAL EVERY 6 HOURS PRN
Status: DISCONTINUED | OUTPATIENT
Start: 2020-10-31 | End: 2020-11-01 | Stop reason: HOSPADM

## 2020-10-31 RX ADMIN — DOCUSATE SODIUM 100 MG: 100 CAPSULE, LIQUID FILLED ORAL at 09:39

## 2020-10-31 RX ADMIN — SIMETHICONE CHEW TAB 80 MG 80 MG: 80 TABLET ORAL at 21:00

## 2020-10-31 RX ADMIN — KETOROLAC TROMETHAMINE 30 MG: 30 INJECTION, SOLUTION INTRAMUSCULAR at 00:21

## 2020-10-31 RX ADMIN — KETOROLAC TROMETHAMINE 30 MG: 30 INJECTION, SOLUTION INTRAMUSCULAR at 05:16

## 2020-10-31 RX ADMIN — ACETAMINOPHEN 650 MG: 325 TABLET, FILM COATED ORAL at 17:01

## 2020-10-31 RX ADMIN — ACETAMINOPHEN 650 MG: 325 TABLET, FILM COATED ORAL at 11:17

## 2020-10-31 RX ADMIN — DOCUSATE SODIUM 100 MG: 100 CAPSULE, LIQUID FILLED ORAL at 17:01

## 2020-10-31 RX ADMIN — ACETAMINOPHEN 650 MG: 325 TABLET, FILM COATED ORAL at 05:16

## 2020-10-31 RX ADMIN — ENOXAPARIN SODIUM 40 MG: 40 INJECTION SUBCUTANEOUS at 22:40

## 2020-11-01 VITALS
RESPIRATION RATE: 18 BRPM | TEMPERATURE: 97.5 F | OXYGEN SATURATION: 100 % | HEIGHT: 63 IN | SYSTOLIC BLOOD PRESSURE: 115 MMHG | WEIGHT: 246 LBS | HEART RATE: 90 BPM | BODY MASS INDEX: 43.59 KG/M2 | DIASTOLIC BLOOD PRESSURE: 74 MMHG

## 2020-11-01 PROCEDURE — 99024 POSTOP FOLLOW-UP VISIT: CPT | Performed by: OBSTETRICS & GYNECOLOGY

## 2020-11-01 RX ORDER — SIMETHICONE 80 MG
80 TABLET,CHEWABLE ORAL EVERY 6 HOURS PRN
Qty: 30 TABLET | Refills: 0 | Status: SHIPPED | OUTPATIENT
Start: 2020-11-01 | End: 2021-05-14

## 2020-11-01 RX ORDER — DOCUSATE SODIUM 100 MG/1
100 CAPSULE, LIQUID FILLED ORAL 2 TIMES DAILY
Qty: 10 CAPSULE | Refills: 0
Start: 2020-11-01 | End: 2021-05-14

## 2020-11-01 RX ORDER — OXYCODONE HYDROCHLORIDE 5 MG/1
5 TABLET ORAL EVERY 4 HOURS PRN
Qty: 12 TABLET | Refills: 0 | Status: SHIPPED | OUTPATIENT
Start: 2020-11-01 | End: 2020-11-11

## 2020-11-01 RX ORDER — IBUPROFEN 600 MG/1
600 TABLET ORAL EVERY 6 HOURS PRN
Qty: 45 TABLET | Refills: 0 | Status: SHIPPED | OUTPATIENT
Start: 2020-11-01 | End: 2021-05-14

## 2020-11-01 RX ADMIN — OXYCODONE HYDROCHLORIDE 10 MG: 5 TABLET ORAL at 06:26

## 2020-11-01 RX ADMIN — ACETAMINOPHEN 650 MG: 325 TABLET, FILM COATED ORAL at 00:09

## 2020-11-01 RX ADMIN — OXYCODONE HYDROCHLORIDE 10 MG: 5 TABLET ORAL at 00:09

## 2020-11-01 RX ADMIN — DOCUSATE SODIUM 100 MG: 100 CAPSULE, LIQUID FILLED ORAL at 08:00

## 2020-11-01 RX ADMIN — ACETAMINOPHEN 650 MG: 325 TABLET, FILM COATED ORAL at 06:26

## 2020-11-01 RX ADMIN — SIMETHICONE CHEW TAB 80 MG 80 MG: 80 TABLET ORAL at 10:46

## 2020-11-01 RX ADMIN — OXYCODONE HYDROCHLORIDE 10 MG: 5 TABLET ORAL at 12:02

## 2020-11-01 RX ADMIN — SIMETHICONE CHEW TAB 80 MG 80 MG: 80 TABLET ORAL at 06:25

## 2020-11-06 ENCOUNTER — POSTPARTUM VISIT (OUTPATIENT)
Dept: OBGYN CLINIC | Facility: CLINIC | Age: 30
End: 2020-11-06

## 2020-11-06 VITALS
TEMPERATURE: 97.4 F | HEIGHT: 63 IN | SYSTOLIC BLOOD PRESSURE: 136 MMHG | BODY MASS INDEX: 41.57 KG/M2 | WEIGHT: 234.6 LBS | DIASTOLIC BLOOD PRESSURE: 92 MMHG

## 2020-11-06 DIAGNOSIS — Z90.79 STATUS POST BILATERAL SALPINGECTOMY: Primary | ICD-10-CM

## 2020-11-06 LAB — PLACENTA IN STORAGE: NORMAL

## 2020-11-06 PROCEDURE — 99024 POSTOP FOLLOW-UP VISIT: CPT | Performed by: OBSTETRICS & GYNECOLOGY

## 2020-11-22 ENCOUNTER — HOSPITAL ENCOUNTER (EMERGENCY)
Facility: HOSPITAL | Age: 30
Discharge: HOME | End: 2020-11-22
Attending: EMERGENCY MEDICINE
Payer: COMMERCIAL

## 2020-11-22 ENCOUNTER — NURSE TRIAGE (OUTPATIENT)
Dept: OTHER | Facility: OTHER | Age: 30
End: 2020-11-22

## 2020-11-22 VITALS
WEIGHT: 225 LBS | SYSTOLIC BLOOD PRESSURE: 141 MMHG | HEART RATE: 83 BPM | DIASTOLIC BLOOD PRESSURE: 89 MMHG | RESPIRATION RATE: 16 BRPM | BODY MASS INDEX: 39.86 KG/M2 | TEMPERATURE: 98.1 F | OXYGEN SATURATION: 98 %

## 2020-11-22 DIAGNOSIS — L03.90 WOUND CELLULITIS: Primary | ICD-10-CM

## 2020-11-22 PROCEDURE — 99283 EMERGENCY DEPT VISIT LOW MDM: CPT

## 2020-11-22 PROCEDURE — 99284 EMERGENCY DEPT VISIT MOD MDM: CPT | Performed by: EMERGENCY MEDICINE

## 2020-11-22 RX ORDER — NYSTATIN 100000 [USP'U]/G
POWDER TOPICAL ONCE
Status: DISCONTINUED | OUTPATIENT
Start: 2020-11-22 | End: 2020-11-22 | Stop reason: HOSPADM

## 2020-11-22 RX ORDER — NYSTATIN 100000 [USP'U]/G
POWDER TOPICAL 3 TIMES DAILY
Qty: 15 G | Refills: 0 | Status: SHIPPED | OUTPATIENT
Start: 2020-11-22 | End: 2021-05-14

## 2020-11-22 RX ORDER — CEPHALEXIN 500 MG/1
500 CAPSULE ORAL EVERY 6 HOURS SCHEDULED
Qty: 20 CAPSULE | Refills: 0 | Status: SHIPPED | OUTPATIENT
Start: 2020-11-22 | End: 2020-11-27

## 2020-11-22 RX ORDER — CEPHALEXIN 250 MG/1
500 CAPSULE ORAL ONCE
Status: COMPLETED | OUTPATIENT
Start: 2020-11-22 | End: 2020-11-22

## 2020-11-22 RX ADMIN — CEPHALEXIN 500 MG: 250 CAPSULE ORAL at 17:02

## 2020-12-11 ENCOUNTER — POSTPARTUM VISIT (OUTPATIENT)
Dept: OBGYN CLINIC | Facility: CLINIC | Age: 30
End: 2020-12-11

## 2020-12-11 VITALS
WEIGHT: 223.4 LBS | HEIGHT: 63 IN | DIASTOLIC BLOOD PRESSURE: 84 MMHG | SYSTOLIC BLOOD PRESSURE: 142 MMHG | BODY MASS INDEX: 39.58 KG/M2

## 2020-12-11 PROCEDURE — 99024 POSTOP FOLLOW-UP VISIT: CPT | Performed by: OBSTETRICS & GYNECOLOGY

## 2021-01-14 ENCOUNTER — TELEPHONE (OUTPATIENT)
Dept: OBGYN CLINIC | Facility: CLINIC | Age: 31
End: 2021-01-14

## 2021-01-14 NOTE — TELEPHONE ENCOUNTER
Spoke with patient  Delivered via c/s in October  Had one period but was very light  Aware likely first menses after her delivery  Patient states last night was heaviest it has ever been  Was getting up throughout the night to change her pads and was soaking through  States changed her pad about 3 times throughout the night  Patient denies any fever, chills or odor and so far today is not as heavy as last night  Patient advised 600mg ibuprofen every 6 hours with food and continue to watch  Aware to call back if persists, any chills fever or odors or if continues like this with her next cycle  All patient questions answered

## 2021-03-10 DIAGNOSIS — Z23 ENCOUNTER FOR IMMUNIZATION: ICD-10-CM

## 2021-03-31 ENCOUNTER — TELEPHONE (OUTPATIENT)
Dept: BARIATRICS | Facility: CLINIC | Age: 31
End: 2021-03-31

## 2021-03-31 NOTE — TELEPHONE ENCOUNTER
Pt returned call to schedule appointment for  Surgical eval , however she does not qualify at this time  Pt was offered MWM but decided that she was going to pass at this time

## 2021-05-14 ENCOUNTER — OFFICE VISIT (OUTPATIENT)
Dept: FAMILY MEDICINE CLINIC | Facility: CLINIC | Age: 31
End: 2021-05-14
Payer: COMMERCIAL

## 2021-05-14 VITALS
SYSTOLIC BLOOD PRESSURE: 110 MMHG | BODY MASS INDEX: 38.98 KG/M2 | HEIGHT: 63 IN | WEIGHT: 220 LBS | HEART RATE: 97 BPM | DIASTOLIC BLOOD PRESSURE: 80 MMHG | OXYGEN SATURATION: 99 % | TEMPERATURE: 99 F

## 2021-05-14 DIAGNOSIS — E66.9 OBESITY (BMI 30-39.9): ICD-10-CM

## 2021-05-14 DIAGNOSIS — J45.40 MODERATE PERSISTENT ASTHMA WITHOUT COMPLICATION: ICD-10-CM

## 2021-05-14 DIAGNOSIS — Z00.00 ANNUAL PHYSICAL EXAM: Primary | ICD-10-CM

## 2021-05-14 DIAGNOSIS — N92.6 IRREGULAR MENSES: ICD-10-CM

## 2021-05-14 DIAGNOSIS — J30.2 SEASONAL ALLERGIES: ICD-10-CM

## 2021-05-14 DIAGNOSIS — L65.9 HAIR THINNING: ICD-10-CM

## 2021-05-14 PROBLEM — O10.913 MATERNAL CHRONIC HYPERTENSION, THIRD TRIMESTER: Status: RESOLVED | Noted: 2020-10-08 | Resolved: 2021-05-14

## 2021-05-14 PROBLEM — Z34.83 PRENATAL CARE, SUBSEQUENT PREGNANCY IN THIRD TRIMESTER: Status: RESOLVED | Noted: 2020-10-23 | Resolved: 2021-05-14

## 2021-05-14 PROBLEM — Z98.891 S/P CESAREAN SECTION: Status: RESOLVED | Noted: 2020-10-30 | Resolved: 2021-05-14

## 2021-05-14 PROCEDURE — 99385 PREV VISIT NEW AGE 18-39: CPT | Performed by: FAMILY MEDICINE

## 2021-05-14 RX ORDER — ALBUTEROL SULFATE 90 UG/1
2 AEROSOL, METERED RESPIRATORY (INHALATION) EVERY 6 HOURS PRN
COMMUNITY
Start: 2021-05-07 | End: 2021-05-14 | Stop reason: SDUPTHER

## 2021-05-14 RX ORDER — MULTIVITAMIN
1 TABLET ORAL DAILY
COMMUNITY

## 2021-05-14 RX ORDER — FLUTICASONE PROPIONATE 110 UG/1
2 AEROSOL, METERED RESPIRATORY (INHALATION) 2 TIMES DAILY
Qty: 12 G | Refills: 2 | Status: SHIPPED | OUTPATIENT
Start: 2021-05-14 | End: 2021-08-27

## 2021-05-14 RX ORDER — CETIRIZINE HYDROCHLORIDE 10 MG/1
10 TABLET ORAL DAILY
Qty: 30 TABLET | Refills: 2 | Status: SHIPPED | OUTPATIENT
Start: 2021-05-14 | End: 2021-12-29 | Stop reason: ALTCHOICE

## 2021-05-14 RX ORDER — KETOTIFEN FUMARATE 0.35 MG/ML
1 SOLUTION/ DROPS OPHTHALMIC 2 TIMES DAILY
Qty: 5 ML | Refills: 0 | Status: SHIPPED | OUTPATIENT
Start: 2021-05-14 | End: 2021-08-27

## 2021-05-14 RX ORDER — CHLORAL HYDRATE 500 MG
1000 CAPSULE ORAL DAILY
COMMUNITY
End: 2021-12-29 | Stop reason: ALTCHOICE

## 2021-05-14 RX ORDER — FLUTICASONE PROPIONATE 50 MCG
1 SPRAY, SUSPENSION (ML) NASAL DAILY
Qty: 9.9 ML | Refills: 2 | Status: SHIPPED | OUTPATIENT
Start: 2021-05-14 | End: 2021-08-27

## 2021-05-14 NOTE — TELEPHONE ENCOUNTER
Saxenda not covered, changed to Ozempic (weekly dosing instead of daily)  If not covered, will refer to weight management       DO Cata Healy [facility]  5/14/2021 1:58 PM

## 2021-05-14 NOTE — PROGRESS NOTES
[address]     NAME: Eleuteriomazin Ballesterosu  AGE: 27 y o  SEX: female  : 1990     DATE: 2021     Assessment and Plan:     Problem List Items Addressed This Visit     None      Visit Diagnoses     Annual physical exam    -  Primary    Relevant Orders    CBC and differential    Comprehensive metabolic panel    Moderate persistent asthma without complication        Relevant Medications    fluticasone (FLOVENT HFA) 110 MCG/ACT inhaler    Other Relevant Orders    Spacer Device for Inhaler    Seasonal allergies        Relevant Medications    fluticasone (FLONASE) 50 mcg/act nasal spray    cetirizine (ZyrTEC) 10 mg tablet    ketotifen (ZADITOR) 0 025 % ophthalmic solution    BMI 38 0-38 9,adult        Relevant Medications    liraglutide (SAXENDA) injection    Other Relevant Orders    Lipid panel    TSH, 3rd generation with Free T4 reflex    Irregular menses        Relevant Orders    TSH, 3rd generation with Free T4 reflex    Hair thinning        Relevant Orders    TSH, 3rd generation with Free T4 reflex    Obesity (BMI 30-39  9)        Relevant Medications    liraglutide (SAXENDA) injection        Immunizations and preventive care screenings were discussed with patient today  Appropriate education was printed on patient's after visit summary  Counseling:  Alcohol/drug use: discussed moderation in alcohol intake, the recommendations for healthy alcohol use, and avoidance of illicit drug use  Dental Health: discussed importance of regular tooth brushing, flossing, and dental visits  Injury prevention: discussed safety/seat belts, safety helmets, smoke detectors, carbon dioxide detectors, and smoking near bedding or upholstery  Sexual health: discussed sexually transmitted diseases, partner selection, use of condoms, avoidance of unintended pregnancy, and contraceptive alternatives    · Exercise: the importance of regular exercise/physical activity was discussed  Recommend exercise 3-5 times per week for at least 30 minutes  BMI Counseling: Body mass index is 38 97 kg/m²  The BMI is above normal  Nutrition recommendations include decreasing portion sizes, encouraging healthy choices of fruits and vegetables, consuming healthier snacks, limiting drinks that contain sugar, moderation in carbohydrate intake, increasing intake of lean protein and reducing intake of cholesterol  Exercise recommendations include moderate physical activity 150 minutes/week and exercising 3-5 times per week  No pharmacotherapy was ordered  Saxenda prescribed  Return in about 2 weeks (around 5/28/2021) for f/u weight and medication  Chief Complaint:     Chief Complaint   Patient presents with   [address] [place], [place] Annual Exam     pt has OB/gyn that she follows with - will schedule pap smear  will call to schedule COVID vaccine once she knows better availability    Menstrual Problem     heavier than it used to be  fatigue, hair loss, generalized body aches and weakness  gave birth 6 months ago and symptoms started after that   Asthma     taking inhaler more frequently  allergies causing asthma to worsen  runny nose, red/watery eyes  benadryl not helping      History of Present Illness:     Adult Annual Physical   Patient here for a comprehensive physical exam  The patient reports problems - as documented below  States that she had worsening of her asthma during her pregnancy  After delivery, things have improved  She was previously on a daily inhaler  Finds that she has had worsening of her asthma as her allergies have worsened, using her albuterol more often  Denies chest tightness today  Last used albuterol yesterday  She is taking benadryl for her seasonal allergies  Has used Zyrtec in the past with benefit  Never used Flonase  States that since delivery she has had some thinning of her hair and fatigue   States that she just feels tired all of the time, despite adequate, uninterrupted sleep  She follows with OB  She is due for a pap smear  Notes that her menses have been heavier since her delivery  She wants the COVID vaccine, going to schedule an appointment  She would like to try a medication for weight loss to help her with her diet and exercise  Diet and Physical Activity  · Diet/Nutrition: well balanced diet and just started weight watchers  · Exercise: no formal exercise  Depression Screening  PHQ-9 Depression Screening    PHQ-9:   Frequency of the following problems over the past two weeks:      Little interest or pleasure in doing things: 0 - not at all  Feeling down, depressed, or hopeless: 0 - not at all  PHQ-2 Score: 0       General Health  · Sleep: sleeps well  · Hearing: normal - bilateral   · Vision: no vision problems  · Dental: no dental visits for >1 year, brushes teeth once daily and flosses teeth occasionally  /GYN Health  · Last menstrual period: 4/7/21  · Contraceptive method: tubal   · History of STDs?: no   · Sexually with one male partner  No concern for STDs  Review of Systems:     Review of Systems   Constitutional: Positive for fatigue  Negative for activity change, appetite change, chills and fever  HENT: Positive for postnasal drip and rhinorrhea  Negative for congestion, ear pain and sore throat  Eyes: Positive for redness and itching  Respiratory: Positive for wheezing (intermittent)  Negative for cough and shortness of breath  Cardiovascular: Negative for chest pain and leg swelling  Gastrointestinal: Negative for abdominal distention, abdominal pain, constipation, diarrhea, nausea and vomiting  Genitourinary: Negative for dysuria, frequency and urgency  Musculoskeletal: Negative for gait problem  Skin: Negative for rash  Neurological: Negative for dizziness, light-headedness and headaches         Past Medical History:     Past Medical History: Diagnosis Date    Anemia     with pregnancy on iron    Asthma     last used inhaler one year ago    Hypertension     Migraine     UTI (urinary tract infection)     Varicella     has disease as child      Past Surgical History:     Past Surgical History:   Procedure Laterality Date     SECTION      HERNIA REPAIR      INDUCED       PA  DELIVERY ONLY N/A 2016    Procedure:  SECTION () REPEAT;  Surgeon: Becka Aaron MD;  Location: [facility] ;  Service: Obstetrics    PA  DELIVERY ONLY N/A 10/30/2020    Procedure:  SECTION () REPEAT;  Surgeon: Ronaldo Lorenzo MD;  Location: [facility];  Service: Obstetrics    SKIN TAG REMOVAL      TUBAL LIGATION Bilateral 10/30/2020    Procedure: LIGATION/COAGULATION TUBAL;  Surgeon: Ronaldo Lorenzo MD;  Location: [facility] ;  Service: Obstetrics      Social History:     E-Cigarette/Vaping    E-Cigarette Use Never User      E-Cigarette/Vaping Substances    Nicotine No     THC No     CBD No     Flavoring No     Other No     Unknown No      Social History     Socioeconomic History    Marital status: Single     Spouse name: None    Number of children: None    Years of education: None    Highest education level: None   Occupational History    None   Social Needs    Financial resource strain: None    Food insecurity     Worry: None     Inability: None    Transportation needs     Medical: None     Non-medical: None   Tobacco Use    Smoking status: Never Smoker    Smokeless tobacco: Never Used   Substance and Sexual Activity    Alcohol use: Yes     Frequency: Monthly or less     Drinks per session: 1 or 2     Binge frequency: Never     Comment: Once every 6 months    Drug use: Never    Sexual activity: Yes     Partners: Male   Lifestyle    Physical activity     Days per week: None     Minutes per session: None    Stress: None   Relationships    Social connections     Talks on phone: None     Gets together: None     Attends Jain service: None     Active member of club or organization: None     Attends meetings of clubs or organizations: None     Relationship status: None    Intimate partner violence     Fear of current or ex partner: None     Emotionally abused: None     Physically abused: None     Forced sexual activity: None   Other Topics Concern    None   Social History Narrative    None      Family History:     Family History   Problem Relation Age of Onset    Diabetes Maternal Aunt         type 1    No Known Problems Mother     Asthma Father     No Known Problems Daughter     No Known Problems Maternal Grandmother     No Known Problems Maternal Grandfather     Asthma Paternal Grandmother     No Known Problems Daughter       Current Medications:     Current Outpatient Medications   Medication Sig Dispense Refill    Multiple Vitamin (multivitamin) tablet Take 1 tablet by mouth daily      Omega-3 Fatty Acids (fish oil) 1,000 mg Take 1,000 mg by mouth daily      albuterol (PROVENTIL HFA,VENTOLIN HFA) 90 mcg/act inhaler Inhale 2 puffs every 4 (four) hours as needed       cetirizine (ZyrTEC) 10 mg tablet Take 1 tablet (10 mg total) by mouth daily 30 tablet 2    fluticasone (FLONASE) 50 mcg/act nasal spray 1 spray into each nostril daily 9 9 mL 2    fluticasone (FLOVENT HFA) 110 MCG/ACT inhaler Inhale 2 puffs 2 (two) times a day Rinse mouth after use  12 g 2    ketotifen (ZADITOR) 0 025 % ophthalmic solution Administer 1 drop to both eyes 2 (two) times a day 5 mL 0    liraglutide (SAXENDA) injection Inject 0 1 mL (0 6 mg total) under the skin daily for 7 days, THEN 0 2 mL (1 2 mg total) daily for 7 days, THEN 0 3 mL (1 8 mg total) daily for 7 days, THEN 0 4 mL (2 4 mg total) daily for 7 days, THEN 0 5 mL (3 mg total) daily  22 mL 0     No current facility-administered medications for this visit  Allergies:      Allergies   Allergen Reactions    Cat Hair Extract     Pollen Extract Physical Exam:     /80 (BP Location: Left arm, Patient Position: Sitting, Cuff Size: Large)   Pulse 97   Temp 99 °F (37 2 °C) (Tympanic)   Ht 5' 3" (1 6 m)   Wt 99 8 kg (220 lb)   SpO2 99%   BMI 38 97 kg/m²     Physical Exam  Vitals signs reviewed  Constitutional:       General: She is not in acute distress  Appearance: Normal appearance  She is obese  HENT:      Head: Normocephalic and atraumatic  Right Ear: External ear normal       Left Ear: External ear normal       Nose: Nose normal       Mouth/Throat:      Mouth: Mucous membranes are moist    Eyes:      Extraocular Movements: Extraocular movements intact  Conjunctiva/sclera: Conjunctivae normal       Pupils: Pupils are equal, round, and reactive to light  Neck:      Musculoskeletal: Neck supple  Cardiovascular:      Rate and Rhythm: Normal rate and regular rhythm  Heart sounds: Normal heart sounds  Pulmonary:      Effort: Pulmonary effort is normal       Breath sounds: Normal breath sounds  Abdominal:      General: Bowel sounds are normal  There is no distension  Palpations: Abdomen is soft  Tenderness: There is no abdominal tenderness  Musculoskeletal:      Right lower leg: No edema  Left lower leg: No edema  Lymphadenopathy:      Cervical: No cervical adenopathy  Skin:     General: Skin is warm  Capillary Refill: Capillary refill takes less than 2 seconds  Findings: No rash  Neurological:      Mental Status: She is alert  Mental status is at baseline          DO SONAL Barreto [facility] [id number]

## 2021-05-14 NOTE — PATIENT INSTRUCTIONS

## 2021-05-17 NOTE — PROGRESS NOTES
Assessment/Plan:    Normal postpartum/postoperative check  Incision appears to be healing well   Slowly increase activity but maintain pelvic rest   Return to office in 2-3 weeks for further postpartum surveillance       Problem List Items Addressed This Visit        Other    Status post bilateral salpingectomy - Primary    Postpartum care following  delivery      Other Visit Diagnoses      delivery delivered                Subjective:      Patient ID: Gab Aguilar is a 27 y o  female  Diane Granado is here today for a postpartum/postoperative visit  She underwent a repeat  section with bilateral tubal ligation/salpingectomy on   She is recovering well  She is bottle-feeding and has no breast complaints  Bowels and bladder have returned to normal   Lochia is decreasing  The following portions of the patient's history were reviewed and updated as appropriate: allergies, current medications, past family history, past medical history, past social history, past surgical history and problem list     Review of Systems   Constitutional: Negative for fatigue and fever  Respiratory: Negative for cough, chest tightness, shortness of breath and wheezing  Cardiovascular: Negative for chest pain, palpitations and leg swelling  Gastrointestinal: Negative for blood in stool, constipation, diarrhea, nausea and vomiting  Endocrine: Negative for polydipsia  Genitourinary: Negative for difficulty urinating, dyspareunia, dysuria, menstrual problem, pelvic pain, urgency, vaginal discharge and vaginal pain  Musculoskeletal: Negative for arthralgias, back pain and joint swelling  Allergic/Immunologic: Negative for environmental allergies  Neurological: Negative for seizures, light-headedness and headaches  Hematological: Does not bruise/bleed easily  Psychiatric/Behavioral: Negative for sleep disturbance  The patient is not nervous/anxious            Objective:      /92 (BP Location: Left arm, Patient Position: Sitting, Cuff Size: Large)   Temp (!) 97 4 °F (36 3 °C)   Ht 5' 3" (1 6 m)   Wt 106 kg (234 lb 9 6 oz)   LMP 01/28/2020   Breastfeeding No Comment: Bottle Feeding   BMI 41 56 kg/m²          Physical Exam  Constitutional:       General: She is not in acute distress  Appearance: Normal appearance  She is obese  She is not ill-appearing  Comments: Young  female   Abdominal:      General: There is no distension  Palpations: Abdomen is soft  There is no mass  Tenderness: There is no abdominal tenderness  There is no guarding or rebound  Comments: Pfannenstiel incision has staples removed in appears to be healing well  Uterus is well involuted   Neurological:      General: No focal deficit present  Mental Status: She is alert and oriented to person, place, and time     Psychiatric:         Mood and Affect: Mood normal          Behavior: Behavior normal

## 2021-05-18 ENCOUNTER — TELEPHONE (OUTPATIENT)
Dept: FAMILY MEDICINE CLINIC | Facility: CLINIC | Age: 31
End: 2021-05-18

## 2021-05-18 NOTE — TELEPHONE ENCOUNTER
Patient is not a known diabetic  She does not need the ozempic for DM  Labs were ordered for her to complete to screen with a fasting glucose for DM  She should complete these labs as this may help with insurance coverage for her medication  This was originally intended to assist with weight loss       Huber Calabrese [facility]  5/18/2021 4:41 PM

## 2021-05-18 NOTE — TELEPHONE ENCOUNTER
PA request received for Ozempic (semaglutide)  I didn't find Metformin on pt list - can you confirm  ? The following question in on the prior auth      Has the patient tried and failed or been unable to tolerate metformin (alone or in combination)? YesNo  For this patient, would other covered alternatives for the same indication as Ozempic be less effective, have adverse events, or both?     Newton Moss

## 2021-05-18 NOTE — TELEPHONE ENCOUNTER
Pt called stating that her pharmacy never received script for  ketotifen (ZADITOR) 0 025 % ophthalmic solution - spoke to Ronny at [facility], [place] said they left a vm to pt and they recommend to buy Zaditor as OTC med - way cheaper than rx'd script  Pt informed  Pt is confirming never being on metformin - pt is not familiar taking  Metformin in the past      Pt notified we are processing prior auth for Ozempic -  Semaglutide,0 25 or 0 5MG/DOS, 2 MG/1 5ML SOPN   Pt verbalized understanding

## 2021-05-21 ENCOUNTER — APPOINTMENT (OUTPATIENT)
Dept: LAB | Facility: HOSPITAL | Age: 31
End: 2021-05-21
Attending: FAMILY MEDICINE
Payer: COMMERCIAL

## 2021-05-21 DIAGNOSIS — L65.9 HAIR THINNING: ICD-10-CM

## 2021-05-21 DIAGNOSIS — N92.6 IRREGULAR MENSES: ICD-10-CM

## 2021-05-21 DIAGNOSIS — Z00.00 ANNUAL PHYSICAL EXAM: ICD-10-CM

## 2021-05-21 LAB
ALBUMIN SERPL BCP-MCNC: 3.8 G/DL (ref 3.5–5)
ALP SERPL-CCNC: 82 U/L (ref 46–116)
ALT SERPL W P-5'-P-CCNC: 21 U/L (ref 12–78)
ANION GAP SERPL CALCULATED.3IONS-SCNC: 8 MMOL/L (ref 4–13)
AST SERPL W P-5'-P-CCNC: 22 U/L (ref 5–45)
BASOPHILS # BLD AUTO: 0.05 THOUSANDS/ΜL (ref 0–0.1)
BASOPHILS NFR BLD AUTO: 1 % (ref 0–1)
BILIRUB SERPL-MCNC: 0.3 MG/DL (ref 0.2–1)
BUN SERPL-MCNC: 6 MG/DL (ref 5–25)
CALCIUM SERPL-MCNC: 8.7 MG/DL (ref 8.3–10.1)
CHLORIDE SERPL-SCNC: 106 MMOL/L (ref 100–108)
CHOLEST SERPL-MCNC: 238 MG/DL (ref 50–200)
CO2 SERPL-SCNC: 27 MMOL/L (ref 21–32)
CREAT SERPL-MCNC: 0.91 MG/DL (ref 0.6–1.3)
EOSINOPHIL # BLD AUTO: 0.31 THOUSAND/ΜL (ref 0–0.61)
EOSINOPHIL NFR BLD AUTO: 7 % (ref 0–6)
ERYTHROCYTE [DISTWIDTH] IN BLOOD BY AUTOMATED COUNT: 16.4 % (ref 11.6–15.1)
GFR SERPL CREATININE-BSD FRML MDRD: 85 ML/MIN/1.73SQ M
GLUCOSE P FAST SERPL-MCNC: 96 MG/DL (ref 65–99)
HCT VFR BLD AUTO: 36.8 % (ref 34.8–46.1)
HDLC SERPL-MCNC: 51 MG/DL
HGB BLD-MCNC: 11 G/DL (ref 11.5–15.4)
IMM GRANULOCYTES # BLD AUTO: 0.01 THOUSAND/UL (ref 0–0.2)
IMM GRANULOCYTES NFR BLD AUTO: 0 % (ref 0–2)
LDLC SERPL CALC-MCNC: 172 MG/DL (ref 0–100)
LYMPHOCYTES # BLD AUTO: 1.49 THOUSANDS/ΜL (ref 0.6–4.47)
LYMPHOCYTES NFR BLD AUTO: 33 % (ref 14–44)
MCH RBC QN AUTO: 22.6 PG (ref 26.8–34.3)
MCHC RBC AUTO-ENTMCNC: 29.9 G/DL (ref 31.4–37.4)
MCV RBC AUTO: 76 FL (ref 82–98)
MONOCYTES # BLD AUTO: 0.31 THOUSAND/ΜL (ref 0.17–1.22)
MONOCYTES NFR BLD AUTO: 7 % (ref 4–12)
NEUTROPHILS # BLD AUTO: 2.33 THOUSANDS/ΜL (ref 1.85–7.62)
NEUTS SEG NFR BLD AUTO: 52 % (ref 43–75)
NONHDLC SERPL-MCNC: 187 MG/DL
NRBC BLD AUTO-RTO: 0 /100 WBCS
PLATELET # BLD AUTO: 319 THOUSANDS/UL (ref 149–390)
PMV BLD AUTO: 10.7 FL (ref 8.9–12.7)
POTASSIUM SERPL-SCNC: 3.8 MMOL/L (ref 3.5–5.3)
PROT SERPL-MCNC: 7.3 G/DL (ref 6.4–8.2)
RBC # BLD AUTO: 4.86 MILLION/UL (ref 3.81–5.12)
SODIUM SERPL-SCNC: 141 MMOL/L (ref 136–145)
TRIGL SERPL-MCNC: 73 MG/DL
TSH SERPL DL<=0.05 MIU/L-ACNC: 1.13 UIU/ML (ref 0.36–3.74)
WBC # BLD AUTO: 4.5 THOUSAND/UL (ref 4.31–10.16)

## 2021-05-21 PROCEDURE — 85025 COMPLETE CBC W/AUTO DIFF WBC: CPT

## 2021-05-21 PROCEDURE — 80061 LIPID PANEL: CPT

## 2021-05-21 PROCEDURE — 84443 ASSAY THYROID STIM HORMONE: CPT

## 2021-05-21 PROCEDURE — 36415 COLL VENOUS BLD VENIPUNCTURE: CPT

## 2021-05-21 PROCEDURE — 80053 COMPREHEN METABOLIC PANEL: CPT

## 2021-05-21 NOTE — TELEPHONE ENCOUNTER
Her glucose was normal on blood this morning  She has hyperlipidemia  Weight loss would be beneficial for her elevated lipids       Martha Torres [facility]  5/21/2021 3:24 PM

## 2021-05-24 ENCOUNTER — IMMUNIZATIONS (OUTPATIENT)
Dept: FAMILY MEDICINE CLINIC | Facility: HOSPITAL | Age: 31
End: 2021-05-24

## 2021-05-24 DIAGNOSIS — D50.9 MICROCYTIC ANEMIA: Primary | ICD-10-CM

## 2021-05-24 DIAGNOSIS — Z23 ENCOUNTER FOR IMMUNIZATION: Primary | ICD-10-CM

## 2021-05-24 PROCEDURE — 91300 SARS-COV-2 / COVID-19 MRNA VACCINE (PFIZER-BIONTECH) 30 MCG: CPT

## 2021-05-24 PROCEDURE — 0001A SARS-COV-2 / COVID-19 MRNA VACCINE (PFIZER-BIONTECH) 30 MCG: CPT

## 2021-05-25 NOTE — TELEPHONE ENCOUNTER
Ozempic has been Denied by [facility]  It was denied because pt does not have Type 2 Diabetes, and have not tried and failed Metformin for DM

## 2021-05-26 NOTE — TELEPHONE ENCOUNTER
Thank you, will discuss with patient at her up coming appointment in 2 days       Eleonora Reyez [facility]  5/26/2021 4:26 PM

## 2021-05-28 ENCOUNTER — APPOINTMENT (OUTPATIENT)
Dept: LAB | Facility: HOSPITAL | Age: 31
End: 2021-05-28
Attending: FAMILY MEDICINE
Payer: COMMERCIAL

## 2021-05-28 ENCOUNTER — OFFICE VISIT (OUTPATIENT)
Dept: FAMILY MEDICINE CLINIC | Facility: CLINIC | Age: 31
End: 2021-05-28
Payer: COMMERCIAL

## 2021-05-28 VITALS
SYSTOLIC BLOOD PRESSURE: 110 MMHG | DIASTOLIC BLOOD PRESSURE: 78 MMHG | HEIGHT: 63 IN | OXYGEN SATURATION: 98 % | HEART RATE: 83 BPM | BODY MASS INDEX: 38.31 KG/M2 | WEIGHT: 216.2 LBS | TEMPERATURE: 97.4 F

## 2021-05-28 DIAGNOSIS — D50.9 IRON DEFICIENCY ANEMIA, UNSPECIFIED IRON DEFICIENCY ANEMIA TYPE: Primary | ICD-10-CM

## 2021-05-28 DIAGNOSIS — J45.40 MODERATE PERSISTENT ASTHMA WITHOUT COMPLICATION: ICD-10-CM

## 2021-05-28 DIAGNOSIS — D50.9 MICROCYTIC ANEMIA: ICD-10-CM

## 2021-05-28 DIAGNOSIS — J30.2 SEASONAL ALLERGIES: ICD-10-CM

## 2021-05-28 LAB
FERRITIN SERPL-MCNC: 3 NG/ML (ref 8–388)
IRON SATN MFR SERPL: 5 %
IRON SERPL-MCNC: 20 UG/DL (ref 50–170)
TIBC SERPL-MCNC: 439 UG/DL (ref 250–450)

## 2021-05-28 PROCEDURE — 36415 COLL VENOUS BLD VENIPUNCTURE: CPT

## 2021-05-28 PROCEDURE — 3008F BODY MASS INDEX DOCD: CPT | Performed by: FAMILY MEDICINE

## 2021-05-28 PROCEDURE — 99214 OFFICE O/P EST MOD 30 MIN: CPT | Performed by: FAMILY MEDICINE

## 2021-05-28 PROCEDURE — 83550 IRON BINDING TEST: CPT

## 2021-05-28 PROCEDURE — 83540 ASSAY OF IRON: CPT

## 2021-05-28 PROCEDURE — 82728 ASSAY OF FERRITIN: CPT

## 2021-05-28 PROCEDURE — 3725F SCREEN DEPRESSION PERFORMED: CPT | Performed by: FAMILY MEDICINE

## 2021-05-28 PROCEDURE — 1036F TOBACCO NON-USER: CPT | Performed by: FAMILY MEDICINE

## 2021-05-28 RX ORDER — FERROUS SULFATE TAB EC 324 MG (65 MG FE EQUIVALENT) 324 (65 FE) MG
324 TABLET DELAYED RESPONSE ORAL
Qty: 60 TABLET | Refills: 2 | Status: SHIPPED | OUTPATIENT
Start: 2021-05-28

## 2021-05-28 NOTE — PROGRESS NOTES
Assessment/Plan:    No problem-specific Assessment & Plan notes found for this encounter  Diagnoses and all orders for this visit:    Body mass index (BMI) of 38 0 to 38 9 in adult  Discussed diet and exercise  GLP-1 not approved by her insurance  She has lost 4 lbs on her own int he last 2 weeks  She is working on diet and exercising more  Will refer to weight management for other options  Possibly phentermine and Topamax   -     Ambulatory referral to Weight Management; Future    Seasonal allergies  Things have improved greatly  Continue with Zyrtec and Flonase  Zaditor drops PRN  Moderate Persistent Asthma, without exacerbation  She is doing well on the Flovent and allergy control, limiting albuterol use to less than once weekly even with her increased level of physical activity  F/U weight in 3 months  Subjective:      Patient ID: Gab Aguilar is a 27 y o  female  HPI     Patient presents to the office for two week follow up  Notes that she is doing weight watchers  She has been doing some short walks 2-3 times per week  She has been working on eating smaller portions and making healthier choices  Notes that her allergies are much improved  She is taking the Zyrtec and the Flonase  Using the eye drops PRN  Notes that her breathing has been doing well  She has not had to use her rescue inhaler in over 1 week  She was using it much more often  The following portions of the patient's history were reviewed and updated as appropriate: allergies, current medications, past family history, past medical history, past social history, past surgical history and problem list     Review of Systems   Constitutional: Negative for activity change, appetite change, fatigue and fever  HENT: Negative for congestion, ear pain, rhinorrhea and sore throat  Eyes: Negative for pain  Respiratory: Negative for cough and shortness of breath      Cardiovascular: Negative for chest pain and leg swelling  Gastrointestinal: Negative for abdominal distention, abdominal pain, constipation, diarrhea, nausea and vomiting  Genitourinary: Negative for dysuria, frequency and urgency  Musculoskeletal: Negative for gait problem  Skin: Negative for rash  Allergic/Immunologic: Positive for environmental allergies  Neurological: Negative for dizziness, light-headedness and headaches  Objective:    /78 (BP Location: Left arm, Patient Position: Sitting, Cuff Size: Large)   Pulse 83   Temp (!) 97 4 °F (36 3 °C) (Tympanic)   Ht 5' 3" (1 6 m)   Wt 98 1 kg (216 lb 3 2 oz)   SpO2 98%   BMI 38 30 kg/m²      Physical Exam  Vitals signs reviewed  Constitutional:       General: She is not in acute distress  Appearance: Normal appearance  HENT:      Head: Normocephalic and atraumatic  Right Ear: External ear normal       Left Ear: External ear normal       Nose: Nose normal       Mouth/Throat:      Mouth: Mucous membranes are moist    Eyes:      Extraocular Movements: Extraocular movements intact  Conjunctiva/sclera: Conjunctivae normal       Pupils: Pupils are equal, round, and reactive to light  Neck:      Musculoskeletal: Neck supple  Cardiovascular:      Rate and Rhythm: Normal rate and regular rhythm  Heart sounds: Normal heart sounds  Pulmonary:      Effort: Pulmonary effort is normal       Breath sounds: Normal breath sounds  No wheezing, rhonchi or rales  Abdominal:      General: Abdomen is flat  Bowel sounds are normal  There is no distension  Palpations: Abdomen is soft  Tenderness: There is no abdominal tenderness  Musculoskeletal:         General: No deformity  Right lower leg: No edema  Left lower leg: No edema  Lymphadenopathy:      Cervical: No cervical adenopathy  Skin:     General: Skin is warm  Capillary Refill: Capillary refill takes less than 2 seconds  Findings: No rash     Neurological:      Mental Status: She is alert  Mental status is at baseline           Chivo Davison DO  [facility]  5/28/2021 8:46 AM

## 2021-06-09 ENCOUNTER — TELEPHONE (OUTPATIENT)
Dept: FAMILY MEDICINE CLINIC | Facility: CLINIC | Age: 31
End: 2021-06-09

## 2021-06-09 NOTE — TELEPHONE ENCOUNTER
Patient called in regards to most recent lab results  Advised per Dr Matt Baltazar labs show iron deficiency anemia and script was sent to pharmacy fo her to take iron twice daily  Patient verbalized understanding

## 2021-06-18 ENCOUNTER — IMMUNIZATIONS (OUTPATIENT)
Dept: FAMILY MEDICINE CLINIC | Facility: HOSPITAL | Age: 31
End: 2021-06-18

## 2021-06-18 DIAGNOSIS — Z23 ENCOUNTER FOR IMMUNIZATION: Primary | ICD-10-CM

## 2021-06-18 PROCEDURE — 0002A SARS-COV-2 / COVID-19 MRNA VACCINE (PFIZER-BIONTECH) 30 MCG: CPT

## 2021-06-18 PROCEDURE — 91300 SARS-COV-2 / COVID-19 MRNA VACCINE (PFIZER-BIONTECH) 30 MCG: CPT

## 2021-08-19 ENCOUNTER — OFFICE VISIT (OUTPATIENT)
Dept: URGENT CARE | Facility: MEDICAL CENTER | Age: 31
End: 2021-08-19
Payer: COMMERCIAL

## 2021-08-19 VITALS
HEIGHT: 63 IN | SYSTOLIC BLOOD PRESSURE: 149 MMHG | DIASTOLIC BLOOD PRESSURE: 75 MMHG | RESPIRATION RATE: 18 BRPM | OXYGEN SATURATION: 94 % | HEART RATE: 100 BPM | BODY MASS INDEX: 40.22 KG/M2 | WEIGHT: 227 LBS | TEMPERATURE: 97.6 F

## 2021-08-19 DIAGNOSIS — R30.0 DYSURIA: Primary | ICD-10-CM

## 2021-08-19 LAB
SL AMB  POCT GLUCOSE, UA: ABNORMAL
SL AMB LEUKOCYTE ESTERASE,UA: ABNORMAL
SL AMB POCT BILIRUBIN,UA: ABNORMAL
SL AMB POCT BLOOD,UA: ABNORMAL
SL AMB POCT CLARITY,UA: ABNORMAL
SL AMB POCT COLOR,UA: ABNORMAL
SL AMB POCT KETONES,UA: ABNORMAL
SL AMB POCT NITRITE,UA: ABNORMAL
SL AMB POCT PH,UA: 6
SL AMB POCT SPECIFIC GRAVITY,UA: 1.02
SL AMB POCT URINE PROTEIN: ABNORMAL
SL AMB POCT UROBILINOGEN: 0.2

## 2021-08-19 PROCEDURE — 99213 OFFICE O/P EST LOW 20 MIN: CPT | Performed by: PHYSICIAN ASSISTANT

## 2021-08-19 PROCEDURE — 87086 URINE CULTURE/COLONY COUNT: CPT | Performed by: PHYSICIAN ASSISTANT

## 2021-08-19 PROCEDURE — 81002 URINALYSIS NONAUTO W/O SCOPE: CPT | Performed by: PHYSICIAN ASSISTANT

## 2021-08-19 RX ORDER — SULFAMETHOXAZOLE AND TRIMETHOPRIM 800; 160 MG/1; MG/1
1 TABLET ORAL EVERY 12 HOURS SCHEDULED
Qty: 14 TABLET | Refills: 0 | Status: SHIPPED | OUTPATIENT
Start: 2021-08-19 | End: 2021-08-26

## 2021-08-19 NOTE — PROGRESS NOTES
[facility] Now        NAME: Cecile Henry is a 32 y o  female  : 1990    MRN: [id number]  DATE: 2021  TIME: 10:43 AM    Assessment and Plan   Dysuria [R30 0]  1  Dysuria  POCT urine dip    sulfamethoxazole-trimethoprim (BACTRIM DS) 800-160 mg per tablet    Urine culture         Patient Instructions     1  Increase fluids  2  Take Bactrim DS 1 tablet twice daily x 7 days  3  Tylenol as needed for discomfort  4  Follow up with PCP in 3-5 days if symptoms persist       Chief Complaint     Chief Complaint   Patient presents with    Possible UTI     burning, frequency and urgency since this morning         History of Present Illness         Elba Barrera is a 29-year-old female presents with a 1 day history of burning, frequency and painful urination  Patient denies any abdominal or back pain, she has had no fever or visible blood in her urine  Review of Systems   Review of Systems   Constitutional: Negative  Respiratory: Negative  Cardiovascular: Negative  Gastrointestinal: Negative  Genitourinary: Positive for dysuria and frequency  Negative for hematuria           Current Medications       Current Outpatient Medications:     albuterol (PROVENTIL HFA,VENTOLIN HFA) 90 mcg/act inhaler, Inhale 2 puffs every 4 (four) hours as needed , Disp: , Rfl:     ferrous sulfate 324 (65 Fe) mg, Take 1 tablet (324 mg total) by mouth 2 (two) times a day before meals, Disp: 60 tablet, Rfl: 2    fluticasone (FLOVENT HFA) 110 MCG/ACT inhaler, Inhale 2 puffs 2 (two) times a day Rinse mouth after use , Disp: 12 g, Rfl: 2    Multiple Vitamin (multivitamin) tablet, Take 1 tablet by mouth daily, Disp: , Rfl:     Omega-3 Fatty Acids (fish oil) 1,000 mg, Take 1,000 mg by mouth daily, Disp: , Rfl:     cetirizine (ZyrTEC) 10 mg tablet, Take 1 tablet (10 mg total) by mouth daily (Patient not taking: Reported on 2021), Disp: 30 tablet, Rfl: 2    fluticasone (FLONASE) 50 mcg/act nasal spray, 1 spray into each nostril daily (Patient not taking: Reported on 2021), Disp: 9 9 mL, Rfl: 2    ketotifen (ZADITOR) 0 025 % ophthalmic solution, Administer 1 drop to both eyes 2 (two) times a day (Patient not taking: Reported on 2021), Disp: 5 mL, Rfl: 0    sulfamethoxazole-trimethoprim (BACTRIM DS) 800-160 mg per tablet, Take 1 tablet by mouth every 12 (twelve) hours for 7 days, Disp: 14 tablet, Rfl: 0    Current Allergies     Allergies as of 2021 - Reviewed 2021   Allergen Reaction Noted    Cat hair extract  2013    Pollen extract  2013            The following portions of the patient's history were reviewed and updated as appropriate: allergies, current medications, past family history, past medical history, past social history, past surgical history and problem list      Past Medical History:   Diagnosis Date    Anemia     with pregnancy on iron    Asthma     last used inhaler one year ago    Hypertension     Migraine     UTI (urinary tract infection)     Varicella     has disease as child       Past Surgical History:   Procedure Laterality Date     SECTION      HERNIA REPAIR      INDUCED       AL  DELIVERY ONLY N/A 2016    Procedure:  SECTION () REPEAT;  Surgeon: Jannet Rosa MD;  [facility];  Service: Obstetrics    AL  DELIVERY ONLY N/A 10/30/2020    Procedure:  SECTION () REPEAT;  Surgeon: Abigail Eason MD;  [facility];  Service: Obstetrics    SKIN TAG REMOVAL      TUBAL LIGATION Bilateral 10/30/2020    Procedure: LIGATION/COAGULATION TUBAL;  Surgeon: Abigail Eason MD;  Location: [facility] ;  Service: Obstetrics       Family History   Problem Relation Age of Onset    Diabetes Maternal Aunt         type 1    No Known Problems Mother     Asthma Father     No Known Problems Daughter     No Known Problems Maternal Grandmother     No Known Problems Maternal Grandfather     Asthma Paternal Grandmother     No Known Problems Daughter          Medications have been verified  Objective   /75   Pulse 100   Temp 97 6 °F (36 4 °C)   Resp 18   Ht 5' 3" (1 6 m)   Wt 103 kg (227 lb)   SpO2 94%   BMI 40 21 kg/m²   No LMP recorded  Physical Exam     Physical Exam  Constitutional:       General: She is not in acute distress  Appearance: Normal appearance  Cardiovascular:      Rate and Rhythm: Normal rate and regular rhythm  Heart sounds: Normal heart sounds  No murmur heard  Pulmonary:      Effort: Pulmonary effort is normal       Breath sounds: Normal breath sounds  Abdominal:      General: Abdomen is protuberant  Palpations: Abdomen is soft  Tenderness: There is no abdominal tenderness  There is no right CVA tenderness or left CVA tenderness  Neurological:      Mental Status: She is alert

## 2021-08-19 NOTE — PATIENT INSTRUCTIONS
1  Increase fluids  2  Take Bactrim DS 1 tablet twice daily x 7 days  3  Tylenol as needed for discomfort  4   Follow up with PCP in 3-5 days if symptoms persist

## 2021-08-20 LAB — BACTERIA UR CULT: NORMAL

## 2021-08-27 ENCOUNTER — OFFICE VISIT (OUTPATIENT)
Dept: OBGYN CLINIC | Facility: CLINIC | Age: 31
End: 2021-08-27
Payer: COMMERCIAL

## 2021-08-27 VITALS
SYSTOLIC BLOOD PRESSURE: 122 MMHG | DIASTOLIC BLOOD PRESSURE: 82 MMHG | BODY MASS INDEX: 40.4 KG/M2 | WEIGHT: 228 LBS | HEIGHT: 63 IN

## 2021-08-27 DIAGNOSIS — Z01.419 ROUTINE GYNECOLOGICAL EXAMINATION: Primary | ICD-10-CM

## 2021-08-27 DIAGNOSIS — N92.1 MENORRHAGIA WITH IRREGULAR CYCLE: ICD-10-CM

## 2021-08-27 DIAGNOSIS — N92.6 IRREGULAR MENSES: ICD-10-CM

## 2021-08-27 PROCEDURE — G0476 HPV COMBO ASSAY CA SCREEN: HCPCS | Performed by: PHYSICIAN ASSISTANT

## 2021-08-27 PROCEDURE — G0145 SCR C/V CYTO,THINLAYER,RESCR: HCPCS | Performed by: PHYSICIAN ASSISTANT

## 2021-08-27 PROCEDURE — 0503F POSTPARTUM CARE VISIT: CPT | Performed by: PHYSICIAN ASSISTANT

## 2021-08-27 PROCEDURE — 99395 PREV VISIT EST AGE 18-39: CPT | Performed by: PHYSICIAN ASSISTANT

## 2021-08-27 NOTE — PROGRESS NOTES
Assessment/Plan   Problem List Items Addressed This Visit        Other    Routine gynecological examination - Primary     Pap guidelines reviewed  Pap with HPV done today  Reviewed heavy irregular menses, will plan to get blood work to further evaluate  Script given  Office will call with results and appropriate follow up  Return to office for annual or as needed  Relevant Orders    Liquid-based pap, screening      Other Visit Diagnoses     Irregular menses        Relevant Orders    CBC and differential    Follicle stimulating hormone    Luteinizing hormone    Hemoglobin A1C    Insulin, fasting    Testosterone    Prolactin    Menorrhagia with irregular cycle        Relevant Orders    CBC and differential    Follicle stimulating hormone    Luteinizing hormone    Hemoglobin A1C    Insulin, fasting    Testosterone    Prolactin          Subjective:     Patient ID: Ember Bailey is a 32 y o  y o  female  HPI  33 yo seen for annual exam  Tubal ligation for birth control  Menses changing pads every 1-2 hrs on the first 2 days, overnight doubles up  Menses more irregular 1-2 weeks late at times  More facial hair growth, losing more hair on head  Some fatigue, better with iron supplementation  Hgb 2021 was 11 0, starting on iron by PCP  Having trouble losing weight  Occ headaches, migraines, her typical    Denies nipple discharge, breast pain or lumps  UTI last week, last day of antibiotic today, feeling better  Denies other bowel or bladder issues  Last pap: 2016 NILM  The following portions of the patient's history were reviewed and updated as appropriate:   She  has a past medical history of Anemia, Asthma, Hypertension, Migraine, UTI (urinary tract infection), and Varicella    She   Patient Active Problem List    Diagnosis Date Noted    Routine gynecological examination 2021    Postpartum care following  delivery 2021    Status post bilateral salpingectomy 10/30/2020    Abnormal glucose tolerance in pregnancy 2020    Hemorrhoids 2016     She  has a past surgical history that includes  section; Hernia repair; Skin tag removal; Induced ; pr  delivery only (N/A, 2016); pr  delivery only (N/A, 10/30/2020); and Tubal ligation (Bilateral, 10/30/2020)  Her family history includes Asthma in her father and paternal grandmother; Diabetes in her maternal aunt; No Known Problems in her daughter, daughter, maternal grandfather, maternal grandmother, and mother  She  reports that she has never smoked  She has never used smokeless tobacco  She reports current alcohol use  She reports that she does not use drugs  Current Outpatient Medications   Medication Sig Dispense Refill    albuterol (PROVENTIL HFA,VENTOLIN HFA) 90 mcg/act inhaler Inhale 2 puffs every 4 (four) hours as needed       cetirizine (ZyrTEC) 10 mg tablet Take 1 tablet (10 mg total) by mouth daily 30 tablet 2    ferrous sulfate 324 (65 Fe) mg Take 1 tablet (324 mg total) by mouth 2 (two) times a day before meals 60 tablet 2    Multiple Vitamin (multivitamin) tablet Take 1 tablet by mouth daily      Omega-3 Fatty Acids (fish oil) 1,000 mg Take 1,000 mg by mouth daily       No current facility-administered medications for this visit  She is allergic to cat hair extract and pollen extract       Menstrual History:  OB History        6    Para   3    Term   3            AB   3    Living   3       SAB   2    TAB   1    Ectopic        Multiple   0    Live Births   3           Obstetric Comments   Chronic hypertension with pregnancy , increase BMI, asthma, prior csection           Patient's last menstrual period was 2021  Review of Systems   Constitutional: Positive for fatigue and unexpected weight change  Negative for fever  HENT: Negative for dental problem and sinus pressure  Eyes: Negative for visual disturbance     Respiratory: Negative for cough, shortness of breath and wheezing  Cardiovascular: Negative for chest pain  Gastrointestinal: Negative for abdominal pain, blood in stool, constipation, diarrhea, nausea and vomiting  Endocrine: Negative for polydipsia  Genitourinary: Positive for menstrual problem  Negative for difficulty urinating, dyspareunia, dysuria, frequency, hematuria, pelvic pain and urgency  Musculoskeletal: Negative for arthralgias and back pain  Neurological: Positive for headaches  Negative for dizziness, seizures and light-headedness  Psychiatric/Behavioral: Negative for suicidal ideas  The patient is not nervous/anxious  Objective:  Vitals:    08/27/21 1055   BP: 122/82   BP Location: Left arm   Patient Position: Sitting   Cuff Size: Standard   Weight: 103 kg (228 lb)   Height: 5' 3" (1 6 m)      Physical Exam  Constitutional:       Appearance: Normal appearance  She is well-developed  Genitourinary:      Vulva, urethra, bladder, vagina and uterus normal       No vulval condylomata, lesion, tenderness, ulcerations, Bartholin's cyst or rash noted  No signs of labial injury  No labial fusion  No inguinal adenopathy present in the right or left side  No urethral prolapse, pain, swelling, tenderness, caruncle, mass or diverticulum present  Bladder is not distended or tender  No signs of injury or lesions in the vagina  No vaginal discharge, erythema, tenderness or bleeding  No cervical motion tenderness, discharge or lesion  Uterus is not enlarged, tender, irregular or mobile  No uterine mass detected  No right or left adnexal mass present  Right adnexa not tender or full  Left adnexa not tender or full  HENT:      Head: Normocephalic and atraumatic  Neck:      Thyroid: No thyromegaly  Cardiovascular:      Rate and Rhythm: Normal rate and regular rhythm  Heart sounds: Normal heart sounds  No murmur heard  No friction rub   No gallop  Pulmonary:      Effort: Pulmonary effort is normal  No respiratory distress  Breath sounds: Normal breath sounds  No wheezing  Chest:      Breasts: Breasts are symmetrical          Right: Normal  No swelling, bleeding, inverted nipple, mass, nipple discharge, skin change or tenderness  Left: Normal  No swelling, bleeding, inverted nipple, mass, nipple discharge, skin change or tenderness  Abdominal:      General: There is no distension  Palpations: Abdomen is soft  There is no mass  Tenderness: There is no abdominal tenderness  There is no guarding or rebound  Hernia: No hernia is present  Lymphadenopathy:      Cervical: No cervical adenopathy  Upper Body:      Right upper body: No supraclavicular, axillary or pectoral adenopathy  Left upper body: No supraclavicular, axillary or pectoral adenopathy  Lower Body: No right inguinal adenopathy  No left inguinal adenopathy  Neurological:      Mental Status: She is alert and oriented to person, place, and time  Skin:     General: Skin is warm and dry     Psychiatric:         Behavior: Behavior normal

## 2021-08-30 PROBLEM — Z01.419 ROUTINE GYNECOLOGICAL EXAMINATION: Status: ACTIVE | Noted: 2021-08-30

## 2021-08-31 NOTE — ASSESSMENT & PLAN NOTE
Pap guidelines reviewed  Pap with HPV done today  Reviewed heavy irregular menses, will plan to get blood work to further evaluate  Script given  Office will call with results and appropriate follow up  Return to office for annual or as needed

## 2021-09-01 LAB
HPV HR 12 DNA CVX QL NAA+PROBE: NEGATIVE
HPV16 DNA CVX QL NAA+PROBE: NEGATIVE
HPV18 DNA CVX QL NAA+PROBE: NEGATIVE

## 2021-09-02 LAB
LAB AP GYN PRIMARY INTERPRETATION: NORMAL
LAB AP LMP: NORMAL
Lab: NORMAL

## 2021-09-03 ENCOUNTER — APPOINTMENT (OUTPATIENT)
Dept: LAB | Facility: HOSPITAL | Age: 31
End: 2021-09-03
Payer: COMMERCIAL

## 2021-09-03 DIAGNOSIS — N92.1 MENORRHAGIA WITH IRREGULAR CYCLE: ICD-10-CM

## 2021-09-03 DIAGNOSIS — N92.6 IRREGULAR MENSES: ICD-10-CM

## 2021-09-03 LAB
BASOPHILS # BLD AUTO: 0.05 THOUSANDS/ΜL (ref 0–0.1)
BASOPHILS NFR BLD AUTO: 1 % (ref 0–1)
EOSINOPHIL # BLD AUTO: 0.14 THOUSAND/ΜL (ref 0–0.61)
EOSINOPHIL NFR BLD AUTO: 3 % (ref 0–6)
ERYTHROCYTE [DISTWIDTH] IN BLOOD BY AUTOMATED COUNT: 16.1 % (ref 11.6–15.1)
EST. AVERAGE GLUCOSE BLD GHB EST-MCNC: 100 MG/DL
FSH SERPL-ACNC: 5.2 MIU/ML
HBA1C MFR BLD: 5.1 %
HCT VFR BLD AUTO: 36.7 % (ref 34.8–46.1)
HGB BLD-MCNC: 11 G/DL (ref 11.5–15.4)
IMM GRANULOCYTES # BLD AUTO: 0.01 THOUSAND/UL (ref 0–0.2)
IMM GRANULOCYTES NFR BLD AUTO: 0 % (ref 0–2)
INSULIN SERPL-ACNC: 22.5 MU/L (ref 3–25)
LH SERPL-ACNC: 4.6 MIU/ML
LYMPHOCYTES # BLD AUTO: 1.53 THOUSANDS/ΜL (ref 0.6–4.47)
LYMPHOCYTES NFR BLD AUTO: 34 % (ref 14–44)
MCH RBC QN AUTO: 23.2 PG (ref 26.8–34.3)
MCHC RBC AUTO-ENTMCNC: 30 G/DL (ref 31.4–37.4)
MCV RBC AUTO: 77 FL (ref 82–98)
MONOCYTES # BLD AUTO: 0.24 THOUSAND/ΜL (ref 0.17–1.22)
MONOCYTES NFR BLD AUTO: 5 % (ref 4–12)
NEUTROPHILS # BLD AUTO: 2.52 THOUSANDS/ΜL (ref 1.85–7.62)
NEUTS SEG NFR BLD AUTO: 57 % (ref 43–75)
NRBC BLD AUTO-RTO: 0 /100 WBCS
PLATELET # BLD AUTO: 310 THOUSANDS/UL (ref 149–390)
PMV BLD AUTO: 10.1 FL (ref 8.9–12.7)
PROLACTIN SERPL-MCNC: 8.4 NG/ML
RBC # BLD AUTO: 4.75 MILLION/UL (ref 3.81–5.12)
TESTOST SERPL-MCNC: 16 NG/DL
WBC # BLD AUTO: 4.49 THOUSAND/UL (ref 4.31–10.16)

## 2021-09-03 PROCEDURE — 36415 COLL VENOUS BLD VENIPUNCTURE: CPT

## 2021-09-03 PROCEDURE — 83036 HEMOGLOBIN GLYCOSYLATED A1C: CPT

## 2021-09-03 PROCEDURE — 83002 ASSAY OF GONADOTROPIN (LH): CPT

## 2021-09-03 PROCEDURE — 84146 ASSAY OF PROLACTIN: CPT

## 2021-09-03 PROCEDURE — 83001 ASSAY OF GONADOTROPIN (FSH): CPT

## 2021-09-03 PROCEDURE — 85025 COMPLETE CBC W/AUTO DIFF WBC: CPT

## 2021-09-03 PROCEDURE — 83525 ASSAY OF INSULIN: CPT

## 2021-09-03 PROCEDURE — 84403 ASSAY OF TOTAL TESTOSTERONE: CPT

## 2021-12-29 ENCOUNTER — TELEMEDICINE (OUTPATIENT)
Dept: FAMILY MEDICINE CLINIC | Facility: CLINIC | Age: 31
End: 2021-12-29
Payer: COMMERCIAL

## 2021-12-29 VITALS — WEIGHT: 228 LBS | HEIGHT: 63 IN | BODY MASS INDEX: 40.4 KG/M2

## 2021-12-29 DIAGNOSIS — Z20.822 EXPOSURE TO COVID-19 VIRUS: ICD-10-CM

## 2021-12-29 DIAGNOSIS — B34.9 VIRAL INFECTION, UNSPECIFIED: Primary | ICD-10-CM

## 2021-12-29 PROCEDURE — U0003 INFECTIOUS AGENT DETECTION BY NUCLEIC ACID (DNA OR RNA); SEVERE ACUTE RESPIRATORY SYNDROME CORONAVIRUS 2 (SARS-COV-2) (CORONAVIRUS DISEASE [COVID-19]), AMPLIFIED PROBE TECHNIQUE, MAKING USE OF HIGH THROUGHPUT TECHNOLOGIES AS DESCRIBED BY CMS-2020-01-R: HCPCS | Performed by: FAMILY MEDICINE

## 2021-12-29 PROCEDURE — U0005 INFEC AGEN DETEC AMPLI PROBE: HCPCS | Performed by: FAMILY MEDICINE

## 2021-12-29 PROCEDURE — 99213 OFFICE O/P EST LOW 20 MIN: CPT | Performed by: FAMILY MEDICINE

## 2022-01-17 ENCOUNTER — TELEMEDICINE (OUTPATIENT)
Dept: FAMILY MEDICINE CLINIC | Facility: CLINIC | Age: 32
End: 2022-01-17
Payer: COMMERCIAL

## 2022-01-17 ENCOUNTER — TELEPHONE (OUTPATIENT)
Dept: FAMILY MEDICINE CLINIC | Facility: CLINIC | Age: 32
End: 2022-01-17

## 2022-01-17 VITALS — WEIGHT: 228 LBS | BODY MASS INDEX: 40.4 KG/M2 | HEIGHT: 63 IN

## 2022-01-17 PROCEDURE — 99213 OFFICE O/P EST LOW 20 MIN: CPT | Performed by: FAMILY MEDICINE

## 2022-01-17 PROCEDURE — 1036F TOBACCO NON-USER: CPT | Performed by: FAMILY MEDICINE

## 2022-01-17 PROCEDURE — 3725F SCREEN DEPRESSION PERFORMED: CPT | Performed by: FAMILY MEDICINE

## 2022-01-17 PROCEDURE — 3008F BODY MASS INDEX DOCD: CPT | Performed by: FAMILY MEDICINE

## 2022-01-17 NOTE — PROGRESS NOTES
Virtual Regular Visit    Verification of patient location:    Patient is located in the following state in which I hold an active license [place]      Assessment/Plan:    Problem List Items Addressed This Visit     None      Visit Diagnoses     BMI 40 0-44 9, adult ([address] )    -  Primary    Relevant Medications    Phentermine-Topiramate 3 75-23 MG CP24    Phentermine-Topiramate 7 5-46 MG CP24      Will start patient on Qsymia  Discussed the importance of pairing this mediation with low calorie diet and increased physical activity  Recommend at least 150 minutes or cardiovascular exercise per week  Patient to use MyFitnessPal for calorie tracking  Discussed risks and benefits and potential side effects of medication  Will follow up in office in 4 weeks  Reason for visit is   Chief Complaint   Patient presents with    Follow-up     Weight loss medication options     Virtual Regular Visit        Encounter provider Mode Jay DO    Provider located at [address]      Recent Visits  No visits were found meeting these conditions  Showing recent visits within past 7 days and meeting all other requirements  Today's Visits  Date Type Provider Dept   01/17/22 Telemedicine Mode Jay DO [facility] [address] [place] [address]   Showing today's visits and meeting all other requirements  Future Appointments  No visits were found meeting these conditions  Showing future appointments within next 150 days and meeting all other requirements       The patient was identified by name and date of birth  Bhavya Nixon was informed that this is a telemedicine visit and that the visit is being conducted through [address] [place] [address] Now and patient was informed that this is a secure, HIPAA-compliant platform  She agrees to proceed     My office door was closed  No one else was in the room    She acknowledged consent and understanding of privacy and security of the video platform  The patient has agreed to participate and understands they can discontinue the visit at any time  Patient is aware this is a billable service  Avinash Ramsay is a 32 y o  female for weight follow up  HPI   Patient would like to discuss weight loss medications  Lizbet Frank was not covered  She has not seen weight management yet as she reports that her insurance does not cover their services unless it is bariatric surgery  Notes that her current weight is 230 lbs  She doing exercise at home with VR headset (boxing)  Notes that she feels soreness in her muscles  She is working on making good dietary choices  Discussed low carb/fat diet with     Notes history of HTN during pregnancy but not issues since then  Denies history of CAD or seizure disorder  She has had a tubal ligation       Past Medical History:   Diagnosis Date    Anemia     with pregnancy on iron    Asthma     last used inhaler one year ago    Hypertension     Migraine     UTI (urinary tract infection)     Varicella     has disease as child     Past Surgical History:   Procedure Laterality Date     SECTION      HERNIA REPAIR      INDUCED       IN  DELIVERY ONLY N/A 2016    Procedure:  SECTION () REPEAT;  Surgeon: Calvin Merida MD;  [facility]: [facility];  Service: Obstetrics    IN  DELIVERY ONLY N/A 10/30/2020    Procedure:  SECTION () REPEAT;  Surgeon: Leah Min MD;  [facility]: [facility];  Service: Obstetrics    SKIN TAG REMOVAL      TUBAL LIGATION Bilateral 10/30/2020    Procedure: LIGATION/COAGULATION TUBAL;  Surgeon: Leah Min MD;  [facility]: [facility];  Service: Obstetrics     Current Outpatient Medications   Medication Sig Dispense Refill    albuterol (PROVENTIL HFA,VENTOLIN HFA) 90 mcg/act inhaler Inhale 2 puffs every 4 (four) hours as needed       ferrous sulfate 324 (65 Fe) mg Take 1 tablet (324 mg total) by mouth 2 (two) times a day before meals 60 tablet 2    Multiple Vitamin (multivitamin) tablet Take 1 tablet by mouth daily      Phentermine-Topiramate 3 75-23 MG CP24 Take 1 capsule by mouth daily for 14 days 14 capsule 0    Phentermine-Topiramate 7 5-46 MG CP24 Take 1 capsule by mouth in the morning 30 capsule 0     No current facility-administered medications for this visit  Allergies   Allergen Reactions    Cat Hair Extract     Pollen Extract      Review of Systems   Constitutional: Negative for activity change, appetite change, fatigue and fever  HENT: Negative for congestion, ear pain, rhinorrhea and sore throat  Eyes: Negative for pain  Respiratory: Negative for cough and shortness of breath  Cardiovascular: Negative for chest pain and leg swelling  Gastrointestinal: Negative for abdominal distention, abdominal pain, constipation, diarrhea, nausea and vomiting  Genitourinary: Negative for dysuria, frequency and urgency  Musculoskeletal: Negative for gait problem  Skin: Negative for rash  Neurological: Negative for dizziness, light-headedness and headaches  Video Exam    Vitals:    01/17/22 1008   Weight: 103 kg (228 lb)   Height: 5' 3" (1 6 m)     Physical Exam  Vitals reviewed  Constitutional:       General: She is not in acute distress  Appearance: Normal appearance  She is obese  HENT:      Head: Normocephalic and atraumatic  Right Ear: External ear normal       Left Ear: External ear normal       Mouth/Throat:      Mouth: Mucous membranes are moist    Eyes:      Extraocular Movements: Extraocular movements intact  Conjunctiva/sclera: Conjunctivae normal    Pulmonary:      Effort: Pulmonary effort is normal    Skin:     Capillary Refill: Capillary refill takes less than 2 seconds  Neurological:      Mental Status: She is alert  Mental status is at baseline            I spent 15 minutes directly with the patient during this visit    VIRTUAL VISIT [address] verbally agrees to participate in [address]  Pt is aware that [address] could be limited without vital signs or the ability to perform a full hands-on physical Nadarobby Ba understands she or the provider may request at any time to terminate the video visit and request the patient to seek care or treatment in person      Deion Oropeza DO  [facility]  1/17/2022 10:43 AM

## 2022-01-17 NOTE — TELEPHONE ENCOUNTER
Pt had virtual visit with Dr Elbert Gallagher  Upon check out I noticed she needs a f/u with Dr Elbert Gallagher  Return in about 4 weeks (around 2/14/2022) for f/u weight  LM on VM for pt to call and schedule the appt

## 2022-02-28 ENCOUNTER — OFFICE VISIT (OUTPATIENT)
Dept: FAMILY MEDICINE CLINIC | Facility: CLINIC | Age: 32
End: 2022-02-28
Payer: COMMERCIAL

## 2022-02-28 VITALS
TEMPERATURE: 96.6 F | SYSTOLIC BLOOD PRESSURE: 122 MMHG | WEIGHT: 225 LBS | HEIGHT: 63 IN | HEART RATE: 98 BPM | BODY MASS INDEX: 39.87 KG/M2 | OXYGEN SATURATION: 100 % | DIASTOLIC BLOOD PRESSURE: 70 MMHG

## 2022-02-28 DIAGNOSIS — J45.40 MODERATE PERSISTENT ASTHMA WITHOUT COMPLICATION: ICD-10-CM

## 2022-02-28 PROBLEM — Z01.419 ROUTINE GYNECOLOGICAL EXAMINATION: Status: RESOLVED | Noted: 2021-08-30 | Resolved: 2022-02-28

## 2022-02-28 PROCEDURE — 3008F BODY MASS INDEX DOCD: CPT | Performed by: FAMILY MEDICINE

## 2022-02-28 PROCEDURE — 3725F SCREEN DEPRESSION PERFORMED: CPT | Performed by: FAMILY MEDICINE

## 2022-02-28 PROCEDURE — 99214 OFFICE O/P EST MOD 30 MIN: CPT | Performed by: FAMILY MEDICINE

## 2022-02-28 PROCEDURE — 1036F TOBACCO NON-USER: CPT | Performed by: FAMILY MEDICINE

## 2022-02-28 NOTE — PROGRESS NOTES
Assessment/Plan:    No problem-specific Assessment & Plan notes found for this encounter  Diagnoses and all orders for this visit:    BMI 39 0-39 9,adult  Patient has lost about 3% of her body weight in 6 weeks  Will continue with current dose and follow up in another 6 weeks  If no more significant weight loss, can consider increasing dosing    -     Phentermine-Topiramate 7 5-46 MG CP24; Take 1 capsule by mouth in the morning    Moderate persistent asthma without complication  Well controlled with PRN albuterol only for exercise  F/U 6 weeks, weight  Subjective:      Patient ID: Juan J Mccoy is a 32 y o  female  HPI  Patient presents to the office for follow up on weight  She started Qsymia about 6 weeks ago  She is currently on the 7 5-46 mg dose and doing well  Denies any side effects  Notes that she has noticed a change in her appetite  Her focus is not on food  States that she has been feeling full sooner  Peak weight : 236 lbs, started medication at 232 lbs  Last weight at home was 225 lbs  She has not been weighing herself everyday  She has been doing the VR FIT XR, she is doing this every other day for about 15-20 minutes per class  She also does some dancing on there for about 10 minutes  Notes that she is breaking a sweat and is out of breath when she is doing the activity  States that she is working on making healthier choices with foods and eating smaller portions  She is still feeling full with the meals that she is eating  She is typically eating lean cuisine for lunches and then for dinners she is having     Asthma is well controlled, using inhaler with exercise which has been her baseline since she was little       The following portions of the patient's history were reviewed and updated as appropriate: allergies, current medications, past family history, past medical history, past social history, past surgical history and problem list     Review of Systems Constitutional: Negative for activity change, appetite change, fatigue and fever  HENT: Negative for congestion, ear pain, rhinorrhea and sore throat  Eyes: Negative for pain  Respiratory: Negative for cough and shortness of breath  Cardiovascular: Negative for chest pain and leg swelling  Gastrointestinal: Negative for abdominal distention, abdominal pain, constipation, diarrhea, nausea and vomiting  Genitourinary: Negative for dysuria, frequency and urgency  Musculoskeletal: Negative for gait problem  Skin: Negative for rash  Neurological: Negative for dizziness, light-headedness and headaches  Objective:  /70 (BP Location: Left arm, Patient Position: Sitting, Cuff Size: Large)   Pulse 98   Temp (!) 96 6 °F (35 9 °C)   Ht 5' 3" (1 6 m)   Wt 102 kg (225 lb)   LMP 02/23/2022   SpO2 100%   BMI 39 86 kg/m²      Physical Exam  Vitals reviewed  Constitutional:       General: She is not in acute distress  Appearance: Normal appearance  She is obese  HENT:      Head: Normocephalic and atraumatic  Right Ear: External ear normal       Left Ear: External ear normal       Nose: Nose normal       Mouth/Throat:      Mouth: Mucous membranes are moist    Eyes:      Extraocular Movements: Extraocular movements intact  Conjunctiva/sclera: Conjunctivae normal       Pupils: Pupils are equal, round, and reactive to light  Cardiovascular:      Rate and Rhythm: Normal rate and regular rhythm  Heart sounds: Normal heart sounds  Pulmonary:      Effort: Pulmonary effort is normal       Breath sounds: Normal breath sounds  No wheezing, rhonchi or rales  Abdominal:      General: Bowel sounds are normal  There is no distension  Palpations: Abdomen is soft  Tenderness: There is no abdominal tenderness  Musculoskeletal:      Right lower leg: No edema  Left lower leg: No edema  Skin:     General: Skin is warm        Capillary Refill: Capillary refill takes less than 2 seconds  Findings: No rash  Neurological:      Mental Status: She is alert  Mental status is at baseline           Sukumar Regan DO  [facility]  2/28/2022 9:57 AM

## 2022-04-11 ENCOUNTER — OFFICE VISIT (OUTPATIENT)
Dept: FAMILY MEDICINE CLINIC | Facility: CLINIC | Age: 32
End: 2022-04-11
Payer: COMMERCIAL

## 2022-04-11 VITALS
SYSTOLIC BLOOD PRESSURE: 126 MMHG | WEIGHT: 220 LBS | DIASTOLIC BLOOD PRESSURE: 82 MMHG | HEART RATE: 100 BPM | OXYGEN SATURATION: 98 % | HEIGHT: 63 IN | TEMPERATURE: 96.5 F | BODY MASS INDEX: 38.98 KG/M2

## 2022-04-11 DIAGNOSIS — E66.09 CLASS 2 OBESITY DUE TO EXCESS CALORIES WITH BODY MASS INDEX (BMI) OF 38.0 TO 38.9 IN ADULT, UNSPECIFIED WHETHER SERIOUS COMORBIDITY PRESENT: Primary | ICD-10-CM

## 2022-04-11 PROCEDURE — 3725F SCREEN DEPRESSION PERFORMED: CPT | Performed by: FAMILY MEDICINE

## 2022-04-11 PROCEDURE — 3008F BODY MASS INDEX DOCD: CPT | Performed by: FAMILY MEDICINE

## 2022-04-11 PROCEDURE — 99214 OFFICE O/P EST MOD 30 MIN: CPT | Performed by: FAMILY MEDICINE

## 2022-04-11 PROCEDURE — 1036F TOBACCO NON-USER: CPT | Performed by: FAMILY MEDICINE

## 2022-04-11 NOTE — PROGRESS NOTES
Assessment/Plan:    No problem-specific Assessment & Plan notes found for this encounter  Diagnoses and all orders for this visit:    Class 2 obesity due to excess calories with body mass index (BMI) of 38 0 to 38 9 in adult, unspecified whether serious comorbidity present  Started Qsymia at 232 lbs, now 220 lbs  Patient has lost > 5% body weight since starting about 12 weeks ago  Will continue with current dose and will follow up in about 6 weeks  Consider dose increase if patient does not continue with weight loss  Subjective:      Patient ID: Celio Calzada is a 32 y o  female  HPI    Patient presents to the office for weight follow up  States that she has been a little more stressed with her kids school work and dance recital coming up  States that she has snacking a little more than usual but she has acknowledged this and is cutting back  She has limited weight herself at home  Denies chest pain, SOB, palpitations, hot flashes or intolerance of heat  States that she is feeling well and is working on getting back to her healthier routine  She has lost 5 lbs since last visit  Started Qsymia at 232 lbs, now 220 lbs  The following portions of the patient's history were reviewed and updated as appropriate: allergies, current medications, past family history, past medical history, past social history, past surgical history and problem list     Review of Systems   Constitutional: Negative for activity change, appetite change, fatigue and fever  HENT: Negative for congestion, ear pain, rhinorrhea and sore throat  Eyes: Negative for pain  Respiratory: Negative for cough and shortness of breath  Cardiovascular: Negative for chest pain and leg swelling  Gastrointestinal: Negative for abdominal distention, abdominal pain, constipation, diarrhea, nausea and vomiting  Genitourinary: Negative for dysuria, frequency and urgency  Musculoskeletal: Negative for gait problem  Skin: Negative for rash  Neurological: Negative for dizziness, light-headedness and headaches  Objective:  /82 (BP Location: Left arm, Patient Position: Sitting, Cuff Size: Large)   Pulse 100   Temp (!) 96 5 °F (35 8 °C)   Ht 5' 3" (1 6 m)   Wt 99 8 kg (220 lb)   LMP 04/04/2022   SpO2 98%   BMI 38 97 kg/m²      Physical Exam  Vitals reviewed  Constitutional:       General: She is not in acute distress  Appearance: Normal appearance  HENT:      Head: Normocephalic and atraumatic  Right Ear: External ear normal       Left Ear: External ear normal       Nose: Nose normal       Mouth/Throat:      Mouth: Mucous membranes are moist    Eyes:      Extraocular Movements: Extraocular movements intact  Conjunctiva/sclera: Conjunctivae normal       Pupils: Pupils are equal, round, and reactive to light  Cardiovascular:      Rate and Rhythm: Normal rate and regular rhythm  Heart sounds: Normal heart sounds  Comments: HR 90 on repeat  Pulmonary:      Effort: Pulmonary effort is normal       Breath sounds: Normal breath sounds  No wheezing, rhonchi or rales  Abdominal:      General: Abdomen is flat  Bowel sounds are normal  There is no distension  Palpations: Abdomen is soft  Tenderness: There is no abdominal tenderness  Musculoskeletal:         General: No deformity  Cervical back: Neck supple  Right lower leg: No edema  Left lower leg: No edema  Lymphadenopathy:      Cervical: No cervical adenopathy  Skin:     General: Skin is warm  Capillary Refill: Capillary refill takes less than 2 seconds  Findings: No rash  Neurological:      Mental Status: She is alert  Mental status is at baseline           DO [facility]  4/11/2022 10:16 AM

## 2022-05-25 ENCOUNTER — OFFICE VISIT (OUTPATIENT)
Dept: FAMILY MEDICINE CLINIC | Facility: CLINIC | Age: 32
End: 2022-05-25
Payer: COMMERCIAL

## 2022-05-25 VITALS
WEIGHT: 217 LBS | TEMPERATURE: 96.6 F | OXYGEN SATURATION: 96 % | DIASTOLIC BLOOD PRESSURE: 70 MMHG | HEART RATE: 68 BPM | HEIGHT: 63 IN | SYSTOLIC BLOOD PRESSURE: 128 MMHG | BODY MASS INDEX: 38.45 KG/M2

## 2022-05-25 DIAGNOSIS — J45.40 MODERATE PERSISTENT ASTHMA WITHOUT COMPLICATION: ICD-10-CM

## 2022-05-25 DIAGNOSIS — Z51.81 ENCOUNTER FOR MEDICATION MONITORING: ICD-10-CM

## 2022-05-25 DIAGNOSIS — E66.09 CLASS 2 OBESITY DUE TO EXCESS CALORIES WITH BODY MASS INDEX (BMI) OF 38.0 TO 38.9 IN ADULT, UNSPECIFIED WHETHER SERIOUS COMORBIDITY PRESENT: Primary | ICD-10-CM

## 2022-05-25 PROCEDURE — 3008F BODY MASS INDEX DOCD: CPT | Performed by: FAMILY MEDICINE

## 2022-05-25 PROCEDURE — 3725F SCREEN DEPRESSION PERFORMED: CPT | Performed by: FAMILY MEDICINE

## 2022-05-25 PROCEDURE — 99214 OFFICE O/P EST MOD 30 MIN: CPT | Performed by: FAMILY MEDICINE

## 2022-05-25 PROCEDURE — 1036F TOBACCO NON-USER: CPT | Performed by: FAMILY MEDICINE

## 2022-05-25 RX ORDER — ALBUTEROL SULFATE 90 UG/1
2 AEROSOL, METERED RESPIRATORY (INHALATION) EVERY 4 HOURS PRN
Qty: 18 G | Refills: 1 | Status: SHIPPED | OUTPATIENT
Start: 2022-05-25

## 2022-05-25 NOTE — PROGRESS NOTES
Assessment/Plan:    No problem-specific Assessment & Plan notes found for this encounter  Diagnoses and all orders for this visit:    Class 2 obesity due to excess calories with body mass index (BMI) of 38 0 to 38 9 in adult, unspecified whether serious comorbidity present  BMI 38 0-38 9,adult  Started Qsymia at 232 lbs, now 217 lbs  Patient has lost > 5% body weight since starting about 18 weeks ago  Weight loss has slowed in the last 6 weeks, will increase dose and follow up in 4 weeks  Consider dose increase if patient does not continue with weight loss  Heavily counseled on the importance of diet and exercise  Moderate persistent asthma without complication  Refill provided  -     albuterol (PROVENTIL HFA,VENTOLIN HFA) 90 mcg/act inhaler; Inhale 2 puffs every 4 (four) hours as needed for wheezing or shortness of breath        BMI Counseling: Body mass index is 38 44 kg/m²  The BMI is above normal  Nutrition recommendations include decreasing portion sizes, encouraging healthy choices of fruits and vegetables, consuming healthier snacks, limiting drinks that contain sugar, moderation in carbohydrate intake, increasing intake of lean protein and reducing intake of cholesterol  Exercise recommendations include moderate physical activity 150 minutes/week and exercising 3-5 times per week  Pharmacotherapy was ordered to help aid in weight loss  Rationale for BMI follow-up plan is due to patient being overweight or obese  Depression Screening and Follow-up Plan: Patient was screened for depression during today's encounter  They screened negative with a PHQ-2 score of 0  Subjective:      Patient ID: Jose Francisco Addison is a 32 y o  female  HPI     Patient presents to the office for follow up  States that she is doing well with the Qsymia  She is trying to be outside/physically active more but her allergies are sometimes burdensome  She is running around with her son   She is not doing structured exercise at this time  Notes that she is going to start working out when school is done  She is paying attention to what she is eating  She has cut back on sugary sweets and some carbs  States that she has put a lot of energy into limiting her sweet tooth and feels that this has been very helpful  She has stopped drinking soda  Working on small changes that will last over time for her  The following portions of the patient's history were reviewed and updated as appropriate: allergies, current medications, past family history, past medical history, past social history, past surgical history and problem list     Review of Systems   Constitutional: Negative for activity change, appetite change, chills, fatigue and fever  HENT: Negative for congestion, ear pain, rhinorrhea and sore throat  Eyes: Positive for itching  Respiratory: Negative for cough and shortness of breath  Cardiovascular: Negative for chest pain, palpitations and leg swelling  Gastrointestinal: Negative for abdominal pain, constipation, diarrhea, nausea and vomiting  Genitourinary: Negative for dysuria, frequency and urgency  Skin: Negative for rash  Allergic/Immunologic: Positive for environmental allergies  Neurological: Negative for dizziness, light-headedness and headaches  Objective:  /70 (BP Location: Left arm, Patient Position: Sitting, Cuff Size: Large)   Pulse 68   Temp (!) 96 6 °F (35 9 °C)   Ht 5' 3" (1 6 m)   Wt 98 4 kg (217 lb)   LMP 05/23/2022   SpO2 96%   BMI 38 44 kg/m²      Physical Exam  Vitals reviewed  Constitutional:       General: She is not in acute distress  Appearance: Normal appearance  She is obese  HENT:      Head: Normocephalic and atraumatic  Right Ear: External ear normal       Left Ear: External ear normal       Nose: Nose normal       Mouth/Throat:      Mouth: Mucous membranes are moist    Eyes:      Extraocular Movements: Extraocular movements intact  Conjunctiva/sclera: Conjunctivae normal    Cardiovascular:      Rate and Rhythm: Normal rate and regular rhythm  Heart sounds: Normal heart sounds  Pulmonary:      Effort: Pulmonary effort is normal       Breath sounds: Normal breath sounds  No wheezing, rhonchi or rales  Abdominal:      General: Bowel sounds are normal  There is no distension  Palpations: Abdomen is soft  Tenderness: There is no abdominal tenderness  Musculoskeletal:         General: No deformity  Cervical back: Neck supple  Right lower leg: No edema  Left lower leg: No edema  Lymphadenopathy:      Cervical: No cervical adenopathy  Skin:     General: Skin is warm  Capillary Refill: Capillary refill takes less than 2 seconds  Findings: No rash  Neurological:      Mental Status: She is alert  Mental status is at baseline           Curt Kim DO  [facility]  5/25/2022 9:05 AM

## 2022-06-18 ENCOUNTER — APPOINTMENT (OUTPATIENT)
Dept: LAB | Facility: HOSPITAL | Age: 32
End: 2022-06-18
Payer: COMMERCIAL

## 2022-06-18 DIAGNOSIS — E66.09 CLASS 2 OBESITY DUE TO EXCESS CALORIES WITH BODY MASS INDEX (BMI) OF 38.0 TO 38.9 IN ADULT, UNSPECIFIED WHETHER SERIOUS COMORBIDITY PRESENT: ICD-10-CM

## 2022-06-18 DIAGNOSIS — Z51.81 ENCOUNTER FOR MEDICATION MONITORING: ICD-10-CM

## 2022-06-18 LAB
ALBUMIN SERPL BCP-MCNC: 3.7 G/DL (ref 3.5–5)
ALP SERPL-CCNC: 78 U/L (ref 46–116)
ALT SERPL W P-5'-P-CCNC: 14 U/L (ref 12–78)
ANION GAP SERPL CALCULATED.3IONS-SCNC: 9 MMOL/L (ref 4–13)
AST SERPL W P-5'-P-CCNC: 14 U/L (ref 5–45)
BILIRUB SERPL-MCNC: 0.32 MG/DL (ref 0.2–1)
BUN SERPL-MCNC: 8 MG/DL (ref 5–25)
CALCIUM SERPL-MCNC: 8.7 MG/DL (ref 8.3–10.1)
CHLORIDE SERPL-SCNC: 111 MMOL/L (ref 100–108)
CO2 SERPL-SCNC: 21 MMOL/L (ref 21–32)
CREAT SERPL-MCNC: 0.95 MG/DL (ref 0.6–1.3)
GFR SERPL CREATININE-BSD FRML MDRD: 80 ML/MIN/1.73SQ M
GLUCOSE P FAST SERPL-MCNC: 83 MG/DL (ref 65–99)
POTASSIUM SERPL-SCNC: 4.1 MMOL/L (ref 3.5–5.3)
PROT SERPL-MCNC: 6.9 G/DL (ref 6.4–8.2)
SODIUM SERPL-SCNC: 141 MMOL/L (ref 136–145)

## 2022-06-18 PROCEDURE — 36415 COLL VENOUS BLD VENIPUNCTURE: CPT

## 2022-06-18 PROCEDURE — 80053 COMPREHEN METABOLIC PANEL: CPT

## 2022-06-21 ENCOUNTER — TELEPHONE (OUTPATIENT)
Dept: FAMILY MEDICINE CLINIC | Facility: CLINIC | Age: 32
End: 2022-06-21

## 2022-06-21 NOTE — TELEPHONE ENCOUNTER
Pt returned message -     Pt aware of test results - Labs grossly WNL - and of Dr Hodge advisements  She expressed verbal understanding  Pt has MYChart Account and saw those  Any questions or concerns pt will call  No further action needed

## 2022-06-22 ENCOUNTER — OFFICE VISIT (OUTPATIENT)
Dept: FAMILY MEDICINE CLINIC | Facility: CLINIC | Age: 32
End: 2022-06-22
Payer: COMMERCIAL

## 2022-06-22 VITALS
WEIGHT: 215 LBS | BODY MASS INDEX: 38.09 KG/M2 | DIASTOLIC BLOOD PRESSURE: 78 MMHG | TEMPERATURE: 97.8 F | SYSTOLIC BLOOD PRESSURE: 118 MMHG | HEIGHT: 63 IN | OXYGEN SATURATION: 98 % | HEART RATE: 91 BPM

## 2022-06-22 DIAGNOSIS — Z51.81 ENCOUNTER FOR MEDICATION MONITORING: ICD-10-CM

## 2022-06-22 DIAGNOSIS — E66.09 CLASS 2 OBESITY DUE TO EXCESS CALORIES WITH BODY MASS INDEX (BMI) OF 38.0 TO 38.9 IN ADULT, UNSPECIFIED WHETHER SERIOUS COMORBIDITY PRESENT: Primary | ICD-10-CM

## 2022-06-22 PROCEDURE — 3008F BODY MASS INDEX DOCD: CPT | Performed by: FAMILY MEDICINE

## 2022-06-22 PROCEDURE — 3725F SCREEN DEPRESSION PERFORMED: CPT | Performed by: FAMILY MEDICINE

## 2022-06-22 PROCEDURE — 99214 OFFICE O/P EST MOD 30 MIN: CPT | Performed by: FAMILY MEDICINE

## 2022-06-22 PROCEDURE — 1036F TOBACCO NON-USER: CPT | Performed by: FAMILY MEDICINE

## 2022-06-22 NOTE — PROGRESS NOTES
Assessment/Plan:    No problem-specific Assessment & Plan notes found for this encounter  Diagnoses and all orders for this visit:    Class 2 obesity due to excess calories with body mass index (BMI) of 38 0 to 38 9 in adult, unspecified whether serious comorbidity present  Encounter for medication monitoring  Started Qsymia at 232 lbs, now 215 lbs  Patient has lost > 5% body weight since starting  Weight loss has slowed recently, dose of Qsymia increased about 2 weeks ago  Heavily counseled on the importance of diet and exercise  Follow up in 4-6 weeks  Subjective:      Patient ID: Kelly Rodriguez is a 32 y o  female  HPI     Patient presents to the office for follow up on weight  Started increased dose on 6/9/22 (about 2 weeks)  States that things have been going well  States that she started boxing game at home  States that she will be increasing her exercise regimen now that she knows she is tolerating the new dose  Notes that she is on her period currently  Started medication on 2/9/22  Alban Epps was not approved  The following portions of the patient's history were reviewed and updated as appropriate: allergies, current medications, past family history, past medical history, past social history, past surgical history and problem list     Review of Systems   Constitutional: Negative for activity change, appetite change, chills, fatigue and fever  HENT: Negative for congestion, ear pain, rhinorrhea and sore throat  Respiratory: Negative for cough and shortness of breath  Cardiovascular: Negative for chest pain and leg swelling  Gastrointestinal: Negative for abdominal pain, constipation, diarrhea, nausea and vomiting  Genitourinary: Negative for dysuria, frequency and urgency  Skin: Negative for rash  Neurological: Negative for dizziness, light-headedness and headaches         Objective:  /78 (BP Location: Left arm, Patient Position: Sitting, Cuff Size: Standard) Pulse 91   Temp 97 8 °F (36 6 °C)   Ht 5' 3" (1 6 m)   Wt 97 5 kg (215 lb)   LMP 05/23/2022   SpO2 98%   BMI 38 09 kg/m²      Physical Exam  Vitals reviewed  Constitutional:       General: She is not in acute distress  Appearance: Normal appearance  HENT:      Head: Normocephalic and atraumatic  Right Ear: External ear normal       Left Ear: External ear normal       Nose: Nose normal       Mouth/Throat:      Mouth: Mucous membranes are moist    Eyes:      Extraocular Movements: Extraocular movements intact  Conjunctiva/sclera: Conjunctivae normal    Cardiovascular:      Rate and Rhythm: Normal rate and regular rhythm  Heart sounds: Normal heart sounds  Pulmonary:      Effort: Pulmonary effort is normal       Breath sounds: No wheezing, rhonchi or rales  Abdominal:      General: Bowel sounds are normal  There is no distension  Palpations: Abdomen is soft  Tenderness: There is no abdominal tenderness  Skin:     General: Skin is warm  Capillary Refill: Capillary refill takes less than 2 seconds  Neurological:      Mental Status: She is alert  Mental status is at baseline           DO [facility]  6/22/2022 9:13 AM

## 2022-06-28 DIAGNOSIS — J45.40 MODERATE PERSISTENT ASTHMA WITHOUT COMPLICATION: ICD-10-CM

## 2022-06-29 DIAGNOSIS — J45.40 MODERATE PERSISTENT ASTHMA WITHOUT COMPLICATION: ICD-10-CM

## 2022-06-29 NOTE — TELEPHONE ENCOUNTER
Pt would like this resent to the Mail order pharmacy        [id number] 05/29/2022 05/25/2022 Qsymia (Capsule, Extended Release)  30 0 30 11 25 MG-69 MG NA [facility]  Private Pay 0 / 0 [place]    1  [id number] 05/02/2022 04/29/2022 Qsymia (Capsule, Extended Release)  30 0 30 7 5 MG-46 MG NA [employer]  Private Pay 0 / 0 PA    1  [id number] 04/04/2022 03/30/2022 Qsymia (Capsule, Extended Release)  30 0 30 7 5 MG-46 MG NA [facility]  Private Pay 0 / 0 PA

## 2022-11-02 ENCOUNTER — OFFICE VISIT (OUTPATIENT)
Dept: URGENT CARE | Facility: CLINIC | Age: 32
End: 2022-11-02

## 2022-11-02 VITALS
HEART RATE: 133 BPM | RESPIRATION RATE: 19 BRPM | HEIGHT: 63 IN | OXYGEN SATURATION: 97 % | WEIGHT: 221.6 LBS | BODY MASS INDEX: 39.27 KG/M2 | TEMPERATURE: 98.6 F

## 2022-11-02 DIAGNOSIS — J06.9 VIRAL URI WITH COUGH: Primary | ICD-10-CM

## 2022-11-02 RX ORDER — PREDNISONE 20 MG/1
20 TABLET ORAL DAILY
Qty: 7 TABLET | Refills: 0 | Status: SHIPPED | OUTPATIENT
Start: 2022-11-02 | End: 2022-11-09

## 2022-11-02 RX ORDER — ALBUTEROL SULFATE 2.5 MG/3ML
2.5 SOLUTION RESPIRATORY (INHALATION) EVERY 6 HOURS PRN
Qty: 1080 ML | Refills: 0 | Status: SHIPPED | OUTPATIENT
Start: 2022-11-02 | End: 2023-01-31

## 2022-11-02 RX ORDER — ALBUTEROL SULFATE 90 UG/1
2 AEROSOL, METERED RESPIRATORY (INHALATION) EVERY 6 HOURS PRN
Qty: 8.5 G | Refills: 0 | Status: SHIPPED | OUTPATIENT
Start: 2022-11-02

## 2022-11-02 NOTE — PROGRESS NOTES
[facility]        NAME: Concetta Alegre is a 28 y o  female  : 1990    MRN: [id number]  DATE: 2022  TIME: 11:39 AM    Assessment and Plan   Viral URI with cough [J06 9]  1  Viral URI with cough  predniSONE 20 mg tablet    albuterol (2 5 mg/3 mL) 0 083 % nebulizer solution    albuterol (ProAir HFA) 90 mcg/act inhaler         Patient Instructions       Follow up with PCP in 3-5 days  Proceed to  ER if symptoms worsen  Chief Complaint     Chief Complaint   Patient presents with   • Cold Like Symptoms     Pt c/o coughing for 3 weeks and developed fever 102 5 with h/a, body aches, SOB with movement since Monday  History of Present Illness       Patient is a 26 yo femalle who presents for evaluation of runny nose, nasal congestion, cough, fevers, vomiting x 3 days  Three kids here with similar symptoms  Eating and drinking ok  No trouble breathing  History of asthma      Review of Systems   Review of Systems   Constitutional: Negative for chills, diaphoresis, fatigue and fever  HENT: Positive for congestion, postnasal drip and rhinorrhea  Negative for ear pain, sinus pressure, sinus pain, sneezing, sore throat and voice change  Eyes: Negative  Respiratory: Positive for cough  Negative for chest tightness, shortness of breath and wheezing  Cardiovascular: Negative for chest pain and palpitations  Gastrointestinal: Negative for constipation, diarrhea, nausea and vomiting  Endocrine: Negative  Genitourinary: Negative for dysuria  Musculoskeletal: Negative for back pain, myalgias and neck pain  Skin: Negative for pallor and rash  Allergic/Immunologic: Negative  Neurological: Negative for dizziness, syncope and headaches  Hematological: Negative  Psychiatric/Behavioral: Negative            Current Medications       Current Outpatient Medications:   •  albuterol (2 5 mg/3 mL) 0 083 % nebulizer solution, Take 3 mL (2 5 mg total) by nebulization every 6 (six) hours as needed for wheezing or shortness of breath, Disp: 1080 mL, Rfl: 0  •  albuterol (ProAir HFA) 90 mcg/act inhaler, Inhale 2 puffs every 6 (six) hours as needed for wheezing, Disp: 8 5 g, Rfl: 0  •  albuterol (PROVENTIL HFA,VENTOLIN HFA) 90 mcg/act inhaler, Inhale 2 puffs every 4 (four) hours as needed for wheezing or shortness of breath, Disp: 18 g, Rfl: 1  •  Phentermine-Topiramate 11 25-69 MG CP24, Take 1 capsule by mouth in the morning (Patient not taking: Reported on 2022), Disp: 30 capsule, Rfl: 0  •  predniSONE 20 mg tablet, Take 1 tablet (20 mg total) by mouth daily for 7 days, Disp: 7 tablet, Rfl: 0  •  ferrous sulfate 324 (65 Fe) mg, Take 1 tablet (324 mg total) by mouth 2 (two) times a day before meals (Patient not taking: Reported on 2022), Disp: 60 tablet, Rfl: 2  •  Multiple Vitamin (multivitamin) tablet, Take 1 tablet by mouth daily (Patient not taking: Reported on 2022), Disp: , Rfl:     Current Allergies     Allergies as of 2022 - Reviewed 2022   Allergen Reaction Noted   • Cat hair extract  2013   • Penicillins Other (See Comments) 2022   • Pollen extract  2013            The following portions of the patient's history were reviewed and updated as appropriate: allergies, current medications, past family history, past medical history, past social history, past surgical history and problem list      Past Medical History:   Diagnosis Date   • Anemia     with pregnancy on iron   • Asthma     last used inhaler one year ago   • Hypertension    • Migraine    • UTI (urinary tract infection)    • Varicella     has disease as child       Past Surgical History:   Procedure Laterality Date   •  SECTION     • HERNIA REPAIR     • INDUCED      • OK  DELIVERY ONLY N/A 2016    Procedure:  SECTION () REPEAT;  Surgeon: Mike Hamilton MD;  [facility];  Service: Obstetrics   • OK  DELIVERY ONLY N/A 10/30/2020 Procedure:  SECTION () REPEAT;  Surgeon: eDric Mathews MD;  [facility] ;  Service: Obstetrics   • SKIN TAG REMOVAL     • TUBAL LIGATION Bilateral 10/30/2020    Procedure: LIGATION/COAGULATION TUBAL;  Surgeon: Deric Mathews MD;  [facility] ;  Service: Obstetrics       Family History   Problem Relation Age of Onset   • Diabetes Maternal Aunt         type 1   • No Known Problems Mother    • Asthma Father    • No Known Problems Daughter    • No Known Problems Maternal Grandmother    • No Known Problems Maternal Grandfather    • Asthma Paternal Grandmother    • No Known Problems Daughter          Medications have been verified  Objective   Pulse (!) 133   Temp 98 6 °F (37 °C)   Resp 19   Ht 5' 3" (1 6 m)   Wt 101 kg (221 lb 9 6 oz)   LMP  (Within Weeks)   SpO2 97%   BMI 39 25 kg/m²        Physical Exam     Physical Exam  Constitutional:       General: She is not in acute distress  Appearance: Normal appearance  She is not ill-appearing or diaphoretic  HENT:      Right Ear: Tympanic membrane, ear canal and external ear normal       Left Ear: Tympanic membrane, ear canal and external ear normal       Nose: Nose normal       Mouth/Throat:      Mouth: Mucous membranes are moist       Pharynx: Oropharynx is clear  Eyes:      Conjunctiva/sclera: Conjunctivae normal    Cardiovascular:      Rate and Rhythm: Normal rate and regular rhythm  Heart sounds: Normal heart sounds  Pulmonary:      Effort: Pulmonary effort is normal       Breath sounds: Normal breath sounds  Comments: Oxygen saturation appropriate for age  No increased work of breathing  No tachypnea  No intercostal retractions, nasal flaring, belly breathing  No audible wheezing  Lungs CTA     Skin:     General: Skin is warm and dry  Neurological:      Mental Status: She is alert     Psychiatric:         Mood and Affect: Mood normal          Behavior: Behavior normal

## 2022-11-02 NOTE — LETTER
[address] [facility] [address] [facility] [address]  Dept: [phone]    November 2, 2022    Patient: Steff Orantes  YOB: 1990    Steff Orantes was seen and evaluated at our [facility]  Please note if Covid and Flu tests are negative, they may return to work when fever free for 24 hours without the use of a fever reducing agent  If Covid or Flu test is positive, they may return to work on 11/07/2022, as this is 5 days from the onset of symptoms  Upon return, they must then adhere to strict masking for an additional 5 days      Sincerely,    Vanesa Shah PA-C

## 2022-11-03 LAB
FLUAV RNA RESP QL NAA+PROBE: POSITIVE
FLUBV RNA RESP QL NAA+PROBE: NEGATIVE
RSV RNA RESP QL NAA+PROBE: NEGATIVE
SARS-COV-2 RNA RESP QL NAA+PROBE: NEGATIVE

## 2022-11-09 ENCOUNTER — OFFICE VISIT (OUTPATIENT)
Dept: FAMILY MEDICINE CLINIC | Facility: CLINIC | Age: 32
End: 2022-11-09

## 2022-11-09 VITALS
OXYGEN SATURATION: 99 % | HEART RATE: 98 BPM | HEIGHT: 63 IN | BODY MASS INDEX: 39.69 KG/M2 | DIASTOLIC BLOOD PRESSURE: 76 MMHG | TEMPERATURE: 99.2 F | WEIGHT: 224 LBS | SYSTOLIC BLOOD PRESSURE: 130 MMHG

## 2022-11-09 DIAGNOSIS — R05.2 SUBACUTE COUGH: Primary | ICD-10-CM

## 2022-11-09 DIAGNOSIS — J11.1 INFLUENZA: ICD-10-CM

## 2022-11-09 RX ORDER — PREDNISONE 20 MG/1
TABLET ORAL
Qty: 13 TABLET | Refills: 0 | Status: SHIPPED | OUTPATIENT
Start: 2022-11-09 | End: 2022-11-18

## 2022-11-09 RX ORDER — GUAIFENESIN 100 MG/5ML
200 SYRUP ORAL 3 TIMES DAILY PRN
Qty: 120 ML | Refills: 0 | Status: SHIPPED | OUTPATIENT
Start: 2022-11-09

## 2022-11-09 NOTE — PROGRESS NOTES
Name: Cheire Lopez      : 1990      MRN: [id number]  Encounter Provider: Sunita Jackson DO  Encounter Date: 2022   [facility] [address] [address]     1  Subacute cough  -     guaiFENesin (ROBITUSSIN) 100 MG/5ML oral liquid; Take 10 mL (200 mg total) by mouth 3 (three) times a day as needed for cough  -     predniSONE 20 mg tablet; Take 2 tablets (40 mg total) by mouth daily for 5 days, THEN 1 tablet (20 mg total) daily for 2 days, THEN 0 5 tablets (10 mg total) daily for 2 days  2  Influenza  -     predniSONE 20 mg tablet; Take 2 tablets (40 mg total) by mouth daily for 5 days, THEN 1 tablet (20 mg total) daily for 2 days, THEN 0 5 tablets (10 mg total) daily for 2 days  Due to wheezing, will change dose of steroid, will do short wean as well  Encouraged albuterol use q 4 hours  Patient to follow up if things worsen or do not improve  Subjective      HPI     Notes that she tested positive for the flu on Wednesday of last week, (1 week ago)  She is on steroids  Has intermittently been using her albuterol and nebulizer  States that this has been helpful  Notes that she is fatigues and coughing a lot  Notes that she had a productive cough for about 3 weeks before the flu, states that the flu made it worse  Notes that her fever returned yesterday  States that her cough is not improvement  Notes nasal congestion is worsening  Feels that things symptoms have backslid on her since yesterday  Pressure in her ears  Reports that she feels her overall breathing is improving  Taking OTC cold and flu  Daughter with RSV, two others with Flu       Review of Systems    Current Outpatient Medications on File Prior to Visit   Medication Sig   • albuterol (2 5 mg/3 mL) 0 083 % nebulizer solution Take 3 mL (2 5 mg total) by nebulization every 6 (six) hours as needed for wheezing or shortness of breath   • albuterol (ProAir HFA) 90 mcg/act inhaler Inhale 2 puffs every 6 (six) hours as needed for wheezing   • albuterol (PROVENTIL HFA,VENTOLIN HFA) 90 mcg/act inhaler Inhale 2 puffs every 4 (four) hours as needed for wheezing or shortness of breath   • [DISCONTINUED] predniSONE 20 mg tablet Take 1 tablet (20 mg total) by mouth daily for 7 days   • ferrous sulfate 324 (65 Fe) mg Take 1 tablet (324 mg total) by mouth 2 (two) times a day before meals (Patient not taking: No sig reported)   • Multiple Vitamin (multivitamin) tablet Take 1 tablet by mouth daily (Patient not taking: No sig reported)   • [DISCONTINUED] liraglutide (SAXENDA) injection Inject 0 1 mL (0 6 mg total) under the skin daily for 7 days, THEN 0 2 mL (1 2 mg total) daily for 7 days, THEN 0 3 mL (1 8 mg total) daily for 7 days, THEN 0 4 mL (2 4 mg total) daily for 7 days, THEN 0 5 mL (3 mg total) daily  • [DISCONTINUED] Phentermine-Topiramate 11 25-69 MG CP24 Take 1 capsule by mouth in the morning (Patient not taking: No sig reported)     Objective     /76 (BP Location: Left arm, Patient Position: Sitting, Cuff Size: Adult)   Pulse 98   Temp 99 2 °F (37 3 °C)   Ht 5' 3" (1 6 m)   Wt 102 kg (224 lb)   SpO2 99%   BMI 39 68 kg/m²     Physical Exam  Vitals reviewed  Constitutional:       General: She is not in acute distress  Appearance: Normal appearance  HENT:      Head: Normocephalic and atraumatic  Right Ear: External ear normal       Left Ear: External ear normal       Nose: Congestion present  Mouth/Throat:      Mouth: Mucous membranes are moist    Eyes:      Extraocular Movements: Extraocular movements intact  Conjunctiva/sclera: Conjunctivae normal    Cardiovascular:      Rate and Rhythm: Normal rate and regular rhythm  Heart sounds: Normal heart sounds  Pulmonary:      Effort: Pulmonary effort is normal       Breath sounds: Wheezing present  No rhonchi or rales  Abdominal:      General: Abdomen is flat   Bowel sounds are normal  There is no distension  Palpations: Abdomen is soft  Tenderness: There is no abdominal tenderness  Musculoskeletal:      Right lower leg: No edema  Left lower leg: No edema  Skin:     General: Skin is warm  Capillary Refill: Capillary refill takes less than 2 seconds  Findings: No rash  Neurological:      Mental Status: She is alert  Mental status is at baseline          Jelena Vásquez DO

## 2022-12-06 ENCOUNTER — TELEPHONE (OUTPATIENT)
Dept: OBGYN CLINIC | Facility: OTHER | Age: 32
End: 2022-12-06

## 2022-12-06 NOTE — TELEPHONE ENCOUNTER
I received a Care Request from patient to schedule for:    Where Does it Hurt? My shoulder down to my elbow  Are you considering joint replacement? No   Are you seeking a second opinion? No  If yes, who is your doctor? I waldom to [facility] at [phone]

## 2022-12-13 ENCOUNTER — OFFICE VISIT (OUTPATIENT)
Dept: OBGYN CLINIC | Facility: CLINIC | Age: 32
End: 2022-12-13

## 2022-12-13 ENCOUNTER — APPOINTMENT (OUTPATIENT)
Dept: RADIOLOGY | Facility: CLINIC | Age: 32
End: 2022-12-13

## 2022-12-13 VITALS
RESPIRATION RATE: 18 BRPM | DIASTOLIC BLOOD PRESSURE: 66 MMHG | BODY MASS INDEX: 39.87 KG/M2 | OXYGEN SATURATION: 99 % | HEIGHT: 63 IN | SYSTOLIC BLOOD PRESSURE: 110 MMHG | HEART RATE: 95 BPM | WEIGHT: 225 LBS

## 2022-12-13 DIAGNOSIS — M54.12 CERVICAL RADICULOPATHY: Primary | ICD-10-CM

## 2022-12-13 DIAGNOSIS — M54.12 CERVICAL RADICULOPATHY: ICD-10-CM

## 2022-12-13 DIAGNOSIS — M40.202 KYPHOSIS OF CERVICAL REGION, UNSPECIFIED KYPHOSIS TYPE: ICD-10-CM

## 2022-12-13 DIAGNOSIS — M25.512 LEFT SHOULDER PAIN, UNSPECIFIED CHRONICITY: ICD-10-CM

## 2022-12-13 RX ORDER — NAPROXEN 500 MG/1
500 TABLET ORAL 2 TIMES DAILY WITH MEALS
Qty: 60 TABLET | Refills: 0 | Status: SHIPPED | OUTPATIENT
Start: 2022-12-13

## 2022-12-13 RX ORDER — METHOCARBAMOL 500 MG/1
500 TABLET, FILM COATED ORAL 4 TIMES DAILY
Qty: 90 TABLET | Refills: 0 | Status: SHIPPED | OUTPATIENT
Start: 2022-12-13

## 2022-12-13 NOTE — PROGRESS NOTES
Subjective:  Chief Complaint   Patient presents with   • Left Shoulder - Pain, Numbness, Tingling, Clicking       Juan J Mccoy is a 28 y o  female complains of left shoulder pain  Onset of the symptoms was several weeks ago  Mechanism of injury: none  Aggravating factors: extending arm straight  Treatment to date: OTC analgesics which are not very effective  Symptoms have gradually worsened  Patient reports neck pain along with radiation of pain from the neck down the left upper extremity with occasional numbness and tingling reported radiating into the left hand and fingers  Denies any weakness  Denies any bowel or bladder incontinence  The following portions of the patient's history were reviewed and updated as appropriate: allergies, current medications, past family history, past medical history, past social history, past surgical history and problem list     Occupation:      Review of Systems   Constitutional: Negative for fever  HENT: Negative for dental problem and headaches  Eyes: Negative for vision loss  Respiratory: Negative for cough and shortness of breath  Skin: Negative for rash and ulcer  Neurological: Negative for dizziness and headaches  Hem/Lymph/Immuno: Negative for blood clots  Does not bruise/bleed easily  Psychiatric/Behavioral: Negative for confusion  Objective:  /66   Pulse 95   Resp 18   Ht 5' 3" (1 6 m)   Wt 102 kg (225 lb)   SpO2 99%   BMI 39 86 kg/m²     Skin: no rashes, lesions, skin discolorations, lacerations  Vasculature: normal radial and ulnar pulse,  normal skin color, normal capillary refill in extremity, no upper extremity edema  Neurologic: Neurologic exam is normal throughout upper extremities, Awake, alert, and oriented x3, no apparent distress  Musculoskeletal:   left SHOULDER EXAM    Intact skin    No erythema, no induration, no signs of infection  No gross deformity    AROM FF: 180 degrees  AROM ER with arm at its side: 90 degrees  AROM IR: 80 degrees      Supraspinatus strength testin+/5  Infraspinatus strength testin/5    Belly press: negative  Bear Hug test: negative    Empty can: Negative  Biceps TTP: Negative  Arc test: Negative  Negative Byrd  Positive Neer's    Neck exam  Tenderness palpation of the paraspinals specifically in the left paraspinals greater than the right low cervical region  No tenderness palpation spinous process cervical spine  Normal range of motion with flexion and right and left rotation  Some limited extension at the neck  Positive Spurling's on the left side  5-5 strength in the upper extremities bilaterally  No sensory deficits to light touch in upper extremities bilaterally        Imaging:    See final report     Assessment/Plan:  1  Left shoulder pain, unspecified chronicity    - XR shoulder 2+ vw left; Future  - Ambulatory Referral to Physical Therapy; Future    2  Cervical radiculopathy  - Ambulatory Referral to Physical Therapy; Future  - XR spine cervical 2 or 3 vw injury; Future  - naproxen (Naprosyn) 500 mg tablet; Take 1 tablet (500 mg total) by mouth 2 (two) times a day with meals  Dispense: 60 tablet; Refill: 0  - methocarbamol (ROBAXIN) 500 mg tablet; Take 1 tablet (500 mg total) by mouth 4 (four) times a day  Dispense: 90 tablet; Refill: 0    3  Kyphosis of cervical region, unspecified kyphosis type        > 35 min devoted to review of previous, pertinent medical records, imaging, discussion of treatment options, counseling and documentation  Radiographs of the left shoulder and cervical spine were reviewed independently with patient  Left shoulder radiograph reveals no acute fractures or dislocations  Cervical spine radiograph reveals no acute fractures or dislocations however does note kyphosis of the cervical spine  Follow-up official reading  Clinical impression is that patient is suffering from cervical radiculopathy which is causing her left shoulder pain    We discussed treatment options which included recommendations for physical therapy and continued oral anti-inflammatory use along with muscle relaxant as needed  Unclear etiology behind patient's cervical kyphosis which can be iatrogenic in nature, congenital, or secondary to underlying autoimmune disease  We will continue to monitor with physician directed physical therapy  Start Naproxen 500 mg PO BID for 10 days with food, then to be used as needed moving forward  They were instructed to discontinue this medication is they experienced any upset stomach  Recommending formal PT- Rx provided for PT  We discussed a HEP and literature was provided for reference  It was explained that this does not supplement formal PT but should serve to bridge the gap, while they wait to get into PT  Advised to avoid any exercises which exacerbate their pain  Follow up in 8 weeks  Should sx's worsen or any concerns arise, they were advised to follow up sooner or seek more immediate medical attention  All of the patient's concerns were addressed and questions answered  They verbalized agreement with and understanding of the treatment plan

## 2023-01-04 ENCOUNTER — EVALUATION (OUTPATIENT)
Dept: PHYSICAL THERAPY | Facility: CLINIC | Age: 33
End: 2023-01-04

## 2023-01-04 DIAGNOSIS — M25.512 LEFT SHOULDER PAIN, UNSPECIFIED CHRONICITY: ICD-10-CM

## 2023-01-04 DIAGNOSIS — M54.12 CERVICAL RADICULOPATHY: ICD-10-CM

## 2023-01-04 NOTE — PROGRESS NOTES
PT Evaluation     Today's date: 2023  Patient name: Esequiel Dudley  : 1990  MRN: [id number]  Referring provider: Bonnie Samuel DO  Dx:   Encounter Diagnosis     ICD-10-CM    1  Left shoulder pain, unspecified chronicity  M25 [id number] Ambulatory Referral to Physical Therapy      2  Cervical radiculopathy  M54 12 Ambulatory Referral to Physical Therapy                     Assessment  Assessment details: Esequiel Dudley is a 28 y o  female who presents to PT with primary c/o L shoulder and neck pain that began with on specific NNAMDI about 1 5 month ago  She presents today with decreased pain, but LUE numbness/tingling, decreased postural strength and endurance, neck and B shoulder ROM WNL but with decreased stability  Findings result in decreased recreational tolerance, decreased sitting tolerance (work)  She would benefit from skilled PT to address these in order to restore PLOF  Pt educated on related anatomy, posture/positioning, symptom presentation, findings, POC and verbalizes understanding  HEP provided this date and pt verbalizes understanding  They leave this IE with all current questions answered to their satisfaction  Impairments: abnormal gait, abnormal or restricted ROM, activity intolerance, impaired physical strength, lacks appropriate home exercise program, pain with function, poor posture  and poor body mechanics  Understanding of Dx/Px/POC: good   Prognosis: good    Goals  STG-in 4 weeks  1  Pain at worst 5/10  2  Pt able to demonstrate proper upright posture, unsupported, x5 mins without external cues  3  Independent with basic HEP    LTG-by DC  1  Pain at worst 2/10  2  Independent with comprehensive HEP  3  Increase FOTO to >/= expected  4  Pt with no reports of LUE radicular sx  5   Pt able to demonstrate proper upright posture, unsupported, x10 mins without external cues      Plan  Plan details: 1-2 x/week  Patient would benefit from: skilled physical therapy  Planned modality interventions: biofeedback, cryotherapy, electrical stimulation/Russian stimulation, iontophoresis, unattended electrical stimulation, ultrasound, traction, thermotherapy: paraffin bath, thermotherapy: hydrocollator packs, TENS and low level laser therapy  Planned therapy interventions: aquatic therapy, body mechanics training, coordination, flexibility, functional ROM exercises, gait training, graded exercise, home exercise program, therapeutic exercise, therapeutic activities, stretching, strengthening, postural training, patient education, neuromuscular re-education, manual therapy and joint mobilization  Frequency: 2x week  Duration in weeks: 8  Plan of Care beginning date: 2023  Plan of Care expiration date: 3/1/2023  Treatment plan discussed with: patient        Subjective Evaluation    History of Present Illness  Mechanism of injury: Angela Redd is a 28 y o  female who presents to PT with primary c/o L shoulder and neck pain  Notes this began about 1 5 month ago upon waking in AM, no other NNAMDI  Notes she thought it would self-resolve, but it never went away  Pain radiated into L forearm and into neck musculature  Notes pain has improved over the last 2 weeks, but is having random instances of numbness/tingling  Constant pins and needles in tip of index finger  Pt works a desk job    Quality of life: good    Pain  Current pain ratin  At best pain ratin  At worst pain ratin  Location: L shoulder/neck  Quality: throbbing  Relieving factors: heat, rest and relaxation  Aggravating factors: keyboarding    Social Support  Lives with: significant other and young children    Employment status: working    Diagnostic Tests  X-ray: abnormal  Treatments  Current treatment: physical therapy  Patient Goals  Patient goals for therapy: decreased pain, increased motion, increased strength, independence with ADLs/IADLs, return to work and return to [employer] activities  Patient goal: restore PLOF        Objective Postural Observations  Seated posture: fair  Standing posture: fair  Correction of posture: makes symptoms better        Palpation   Left   Tenderness of the levator scapulae, lower trapezius, middle trapezius and upper trapezius  Right   Tenderness of the levator scapulae, lower trapezius, middle trapezius and upper trapezius  Active Range of Motion   Cervical/Thoracic Spine       Cervical    Flexion: 50 degrees   Extension: 50 degrees      Left lateral flexion: 50 degrees      Right lateral flexion: 40 degrees      Left rotation: 85 degrees  Right rotation: 85 degrees         Left Shoulder   Flexion: WFL  Abduction: WFL  External rotation BTH: T1   Internal rotation BTB: T10     Right Shoulder   Flexion: WFL  Abduction: WFL  External rotation BTH: T1   Internal rotation BTB: T10     Strength/Myotome Testing     Left Shoulder     Planes of Motion   Flexion: 5   Extension: 5   Abduction: 5   Adduction: 5     Right Shoulder     Planes of Motion   Flexion: 5   Extension: 5   Abduction: 5   Adduction: 5     Tests     Left Shoulder   Negative empty can, Hawkin's and painful arc       General Comments:      Shoulder Comments   Decreased postural strength and endurance  Decreased C/S endurance             Precautions: HTN, anemia, asthma      RE; 2/1  EPOC: 3/1             Manuals 1/4            Supine C/S STM/SOR MS            Supine C/S traction MS                                      Neuro Re-Ed                                                                                                        Ther Ex             UBE-retro for postural activation             Chin tucks 3"x10            scap retraction 3"x10            BL ER             horiz abd             UT/LS stretches 10"x5 ea            Prone I, W             Open books             Row/LPD             T/S ext                                       Pt edu/HEP MS            Ther Activity                                       Gait Training Modalities

## 2023-01-09 DIAGNOSIS — M54.12 CERVICAL RADICULOPATHY: ICD-10-CM

## 2023-01-09 RX ORDER — NAPROXEN 500 MG/1
TABLET ORAL
Qty: 60 TABLET | Refills: 0 | Status: SHIPPED | OUTPATIENT
Start: 2023-01-09

## 2023-01-12 ENCOUNTER — APPOINTMENT (OUTPATIENT)
Dept: PHYSICAL THERAPY | Facility: CLINIC | Age: 33
End: 2023-01-12

## 2023-01-19 ENCOUNTER — APPOINTMENT (OUTPATIENT)
Dept: PHYSICAL THERAPY | Facility: CLINIC | Age: 33
End: 2023-01-19

## 2023-01-26 ENCOUNTER — APPOINTMENT (OUTPATIENT)
Dept: PHYSICAL THERAPY | Facility: CLINIC | Age: 33
End: 2023-01-26

## 2023-02-06 ENCOUNTER — TELEPHONE (OUTPATIENT)
Dept: PHYSICAL THERAPY | Facility: CLINIC | Age: 33
End: 2023-02-06

## 2023-05-22 ENCOUNTER — OFFICE VISIT (OUTPATIENT)
Dept: FAMILY MEDICINE CLINIC | Facility: CLINIC | Age: 33
End: 2023-05-22

## 2023-05-22 ENCOUNTER — APPOINTMENT (OUTPATIENT)
Dept: LAB | Facility: HOSPITAL | Age: 33
End: 2023-05-22

## 2023-05-22 VITALS
RESPIRATION RATE: 16 BRPM | HEIGHT: 63 IN | DIASTOLIC BLOOD PRESSURE: 80 MMHG | BODY MASS INDEX: 42.91 KG/M2 | HEART RATE: 108 BPM | SYSTOLIC BLOOD PRESSURE: 124 MMHG | OXYGEN SATURATION: 98 % | TEMPERATURE: 98 F | WEIGHT: 242.2 LBS

## 2023-05-22 DIAGNOSIS — R63.5 WEIGHT GAIN: ICD-10-CM

## 2023-05-22 DIAGNOSIS — E66.01 CLASS 3 SEVERE OBESITY DUE TO EXCESS CALORIES WITHOUT SERIOUS COMORBIDITY WITH BODY MASS INDEX (BMI) OF 40.0 TO 44.9 IN ADULT (HCC): ICD-10-CM

## 2023-05-22 DIAGNOSIS — D50.9 IRON DEFICIENCY ANEMIA, UNSPECIFIED IRON DEFICIENCY ANEMIA TYPE: Primary | ICD-10-CM

## 2023-05-22 DIAGNOSIS — E55.9 VITAMIN D DEFICIENCY: ICD-10-CM

## 2023-05-22 DIAGNOSIS — Z13.1 SCREENING FOR DIABETES MELLITUS: ICD-10-CM

## 2023-05-22 DIAGNOSIS — Z13.6 SCREENING FOR CARDIOVASCULAR CONDITION: ICD-10-CM

## 2023-05-22 DIAGNOSIS — J30.2 SEASONAL ALLERGIES: ICD-10-CM

## 2023-05-22 DIAGNOSIS — Z00.00 ANNUAL PHYSICAL EXAM: Primary | ICD-10-CM

## 2023-05-22 PROBLEM — E66.813 CLASS 3 SEVERE OBESITY DUE TO EXCESS CALORIES WITHOUT SERIOUS COMORBIDITY WITH BODY MASS INDEX (BMI) OF 40.0 TO 44.9 IN ADULT: Status: ACTIVE | Noted: 2023-05-22

## 2023-05-22 PROBLEM — O99.810 ABNORMAL GLUCOSE TOLERANCE IN PREGNANCY: Status: RESOLVED | Noted: 2020-09-08 | Resolved: 2023-05-22

## 2023-05-22 LAB
25(OH)D3 SERPL-MCNC: 12.1 NG/ML (ref 30–100)
ALBUMIN SERPL BCP-MCNC: 4.3 G/DL (ref 3.5–5)
ALP SERPL-CCNC: 92 U/L (ref 34–104)
ALT SERPL W P-5'-P-CCNC: 12 U/L (ref 7–52)
ANION GAP SERPL CALCULATED.3IONS-SCNC: 5 MMOL/L (ref 4–13)
AST SERPL W P-5'-P-CCNC: 16 U/L (ref 13–39)
BASOPHILS # BLD AUTO: 0.06 THOUSANDS/ÂΜL (ref 0–0.1)
BASOPHILS NFR BLD AUTO: 1 % (ref 0–1)
BILIRUB SERPL-MCNC: 0.34 MG/DL (ref 0.2–1)
BUN SERPL-MCNC: 8 MG/DL (ref 5–25)
CALCIUM SERPL-MCNC: 9.3 MG/DL (ref 8.4–10.2)
CHLORIDE SERPL-SCNC: 106 MMOL/L (ref 96–108)
CHOLEST SERPL-MCNC: 225 MG/DL
CO2 SERPL-SCNC: 27 MMOL/L (ref 21–32)
CREAT SERPL-MCNC: 0.88 MG/DL (ref 0.6–1.3)
EOSINOPHIL # BLD AUTO: 0.37 THOUSAND/ÂΜL (ref 0–0.61)
EOSINOPHIL NFR BLD AUTO: 7 % (ref 0–6)
ERYTHROCYTE [DISTWIDTH] IN BLOOD BY AUTOMATED COUNT: 17 % (ref 11.6–15.1)
EST. AVERAGE GLUCOSE BLD GHB EST-MCNC: 94 MG/DL
FERRITIN SERPL-MCNC: 2 NG/ML (ref 11–307)
GFR SERPL CREATININE-BSD FRML MDRD: 87 ML/MIN/1.73SQ M
GLUCOSE P FAST SERPL-MCNC: 88 MG/DL (ref 65–99)
HBA1C MFR BLD: 4.9 %
HCT VFR BLD AUTO: 34.3 % (ref 34.8–46.1)
HDLC SERPL-MCNC: 69 MG/DL
HGB BLD-MCNC: 9.8 G/DL (ref 11.5–15.4)
IMM GRANULOCYTES # BLD AUTO: 0.02 THOUSAND/UL (ref 0–0.2)
IMM GRANULOCYTES NFR BLD AUTO: 0 % (ref 0–2)
IRON SATN MFR SERPL: 4 % (ref 15–50)
IRON SERPL-MCNC: 19 UG/DL (ref 50–170)
LDLC SERPL CALC-MCNC: 139 MG/DL (ref 0–100)
LYMPHOCYTES # BLD AUTO: 1.82 THOUSANDS/ÂΜL (ref 0.6–4.47)
LYMPHOCYTES NFR BLD AUTO: 35 % (ref 14–44)
MCH RBC QN AUTO: 19.9 PG (ref 26.8–34.3)
MCHC RBC AUTO-ENTMCNC: 28.6 G/DL (ref 31.4–37.4)
MCV RBC AUTO: 70 FL (ref 82–98)
MONOCYTES # BLD AUTO: 0.33 THOUSAND/ÂΜL (ref 0.17–1.22)
MONOCYTES NFR BLD AUTO: 6 % (ref 4–12)
NEUTROPHILS # BLD AUTO: 2.67 THOUSANDS/ÂΜL (ref 1.85–7.62)
NEUTS SEG NFR BLD AUTO: 51 % (ref 43–75)
NONHDLC SERPL-MCNC: 156 MG/DL
NRBC BLD AUTO-RTO: 0 /100 WBCS
PLATELET # BLD AUTO: 429 THOUSANDS/UL (ref 149–390)
PMV BLD AUTO: 9.6 FL (ref 8.9–12.7)
POTASSIUM SERPL-SCNC: 4.3 MMOL/L (ref 3.5–5.3)
PROT SERPL-MCNC: 6.9 G/DL (ref 6.4–8.4)
RBC # BLD AUTO: 4.93 MILLION/UL (ref 3.81–5.12)
SODIUM SERPL-SCNC: 138 MMOL/L (ref 135–147)
TIBC SERPL-MCNC: 480 UG/DL (ref 250–450)
TRIGL SERPL-MCNC: 86 MG/DL
TSH SERPL DL<=0.05 MIU/L-ACNC: 1.45 UIU/ML (ref 0.45–4.5)
VIT B12 SERPL-MCNC: 225 PG/ML (ref 180–914)
WBC # BLD AUTO: 5.27 THOUSAND/UL (ref 4.31–10.16)

## 2023-05-22 RX ORDER — FLUTICASONE PROPIONATE 50 MCG
1 SPRAY, SUSPENSION (ML) NASAL DAILY
Qty: 16 G | Refills: 1 | Status: SHIPPED | OUTPATIENT
Start: 2023-05-22

## 2023-05-22 RX ORDER — LEVOCETIRIZINE DIHYDROCHLORIDE 5 MG/1
5 TABLET, FILM COATED ORAL EVERY EVENING
Qty: 30 TABLET | Refills: 1 | Status: SHIPPED | OUTPATIENT
Start: 2023-05-22

## 2023-05-22 RX ORDER — ERGOCALCIFEROL 1.25 MG/1
50000 CAPSULE ORAL WEEKLY
Qty: 12 CAPSULE | Refills: 0 | Status: SHIPPED | OUTPATIENT
Start: 2023-05-22 | End: 2023-08-14

## 2023-05-22 NOTE — ASSESSMENT & PLAN NOTE
Has been using Zyrtec without significant relief  We will try Xyzal 5 mg daily with dinner and Flonase in the morning

## 2023-05-22 NOTE — ASSESSMENT & PLAN NOTE
Has gained about 20 pounds in 6 months  Admits to poor diet in the past that mostly included carbs and prepackaged food  Encouraged to research intermittent fasting, decrease carbohydrates and added sugar  Start walking at least 3 times a week for 30 minutes at a time

## 2023-05-22 NOTE — PATIENT INSTRUCTIONS
Healthy eating habits for metabolic balance and weight loss      Create healthy habits:    Aim for 1500 -1200 calories a day depending on activity level  Aim for 40% fat, 40% protein, 20% carbs - track it on the lucian   Drink plenty of water - half of your body weight in oz   Aim for 8h of sleep per night   Exercise minimum of 3 days per week - at least 30min per day   Consider implementing intermittent fasting and start by fasting for 12h and extend to 16h    Some categories of food have consistently been found to be health-promoting and associated with a lower risk of chronic disease and healthy weight  These include:   Vegetables  Whole fruits (not juices)   Legumes (beans, lentils, peas, chickpeas, tofu, tempeh, edamame)   Whole grains (oats, whole wheat, brown rice, barley, etc)  Nuts ands seeds  Fish that are rich in omega-3 fatty acids    Healthier eating patterns that focus more on plant-based foods and less on processed foods have been shown in many prospective observational studies to improve health outcomes  Foods to AVOID:   Processed meats (such as pepperoni, cold cuts, deli meats, etc )  Added sugars (as found in sugar-sweetened beverages, most commercial breakfast cereals, many breads, desserts, many ultraprocessed foods)  Refined grains (e g , white rice, “white” breads, rolls, crackers, and other foods made with processed grains; note that “multigrain” does not mean whole grain - it usually means multiple refined grains)  Ultraprocessed foods (e g , most commercial snack foods, chips, crackers, etc )    Ultraprocessed foods are especially a concern because they are very calorie dense, hyperpalatable, lack fiber, and have additives not found in nature        Supplement recommendations for metabolic balance    Start with basic supplement support:   ONE high-quality/potency multivitamin: every other day for micronutrient support that can help to balance blood sugar   Magnesium Glycinate: nightly for mineral support  Magnesium insufficiency is associated with an increased risk of type 2 diabetes, whereas magnesium supplementation improves insulin sensitivity and blood sugar control  Pure BiOme Intensive Probiotic or UltraFlora Spectrum: daily microbiome support that contains the most well-studied strains that demonstrate an anti-diabetic effect  Berberine: To improve metabolic imbalance 3xtimes daily with meals   Sanjiv, Green Tea/Matcha daily   Psyllium daily     Some of the weight loss apps to look into:    Lose It! -- free weight loss lucian builds a custom nutrition plan for your weight loss goals  It uses your information to create a daily calorie guide and has a food tracking log so you can keep track of your progress  MyFitnessPal --free weight loss lucian to track calories and food intake  Their food tracking database boats over 5 million existing food options to choose from, and they integrate restaurants too  Going out to eat can make food logging incredibly difficult, and MyFitnessPal helps make that process a little bit easier  StepBet -- is another free weight loss lucian, but this one has a twist  Instead of simply logging your own activity, in StepBet, you join fitness challenges and bet  That’s right, you roberts on your own weight loss success rate  For those of us who need some extra motivation and accountability, this is a pretty cool concept  Fooducate -- is a free weight loss lucian that heads to the root of weight gain, the grocery store  While a lot of other apps focus on exercise and food tracking, Fooducate is one of the few trying to help you make better food choices from the beginning  You can scan items while you’re at the store to check out information on nutrition and warnings, like gross ingredients that are hidden under other names  Sweatcoin -- is the weight loss lucian that pays you to lose weight  Sweatcoin tracks all of your outdoor steps toward rewards   They have their own points currency and you can earn things like subscriptions to health related apps, like Calm, or even help from a virtual   If point systems get you motivated, [employer]at[employer] may be the perfect free weight loss lucian for you  Wellness Visit for Adults   AMBULATORY CARE:   A wellness visit  is when you see your healthcare provider to get screened for health problems  Your healthcare provider will also give you advice on how to stay healthy  Write down your questions so you remember to ask them  Ask your healthcare provider how often you should have a wellness visit  What happens at a wellness visit:  Your healthcare provider will ask about your health, and your family history of health problems  This includes high blood pressure, heart disease, and cancer  He or she will ask if you have symptoms that concern you, if you smoke, and about your mood  You may also be asked about your intake of medicines, supplements, food, and alcohol  Any of the following may be done: Your weight  will be checked  Your height may also be checked so your body mass index (BMI) can be calculated  Your BMI shows if you are at a healthy weight  Your blood pressure  and heart rate will be checked  Your temperature may also be checked  Blood and urine tests  may be done  Blood tests may be done to check your cholesterol levels  Abnormal cholesterol levels increase your risk for heart disease and stroke  You may also need a blood or urine test to check for diabetes if you are at increased risk  Urine tests may be done to look for signs of an infection or kidney disease  A physical exam  includes checking your heartbeat and lungs with a stethoscope  Your healthcare provider may also check your skin to look for sun damage  Screening tests  may be recommended  A screening test is done to check for diseases that may not cause symptoms   The screening tests you may need depend on your age, gender, family history, and lifestyle habits  For example, colorectal screening may be recommended if you are 48years old or older  Screening tests you need if you are a woman:   A Pap smear  is used to screen for cervical cancer  Pap smears are usually done every 3 to 5 years depending on your age  You may need them more often if you have had abnormal Pap smear test results in the past  Ask your healthcare provider how often you should have a Pap smear  A mammogram  is an x-ray of your breasts to screen for breast cancer  Experts recommend mammograms every 2 years starting at age 48 years  You may need a mammogram at age 52 years or younger if you have an increased risk for breast cancer  Talk to your healthcare provider about when you should start having mammograms and how often you need them  Vaccines you may need:   Get an influenza vaccine  every year  The influenza vaccine protects you from the flu  Several types of viruses cause the flu  The viruses change over time, so new vaccines are made each year  Get a tetanus-diphtheria (Td) booster vaccine  every 10 years  This vaccine protects you against tetanus and diphtheria  Tetanus is a severe infection that may cause painful muscle spasms and lockjaw  Diphtheria is a severe bacterial infection that causes a thick covering in the back of your mouth and throat  Get a human papillomavirus (HPV) vaccine  if you are female and aged 23 to 32 or male 23 to 24 and never received it  This vaccine protects you from HPV infection  HPV is the most common infection spread by sexual contact  HPV may also cause vaginal, penile, and anal cancers  Get a pneumococcal vaccine  if you are aged 72 years or older  The pneumococcal vaccine is an injection given to protect you from pneumococcal disease  Pneumococcal disease is an infection caused by pneumococcal bacteria  The infection may cause pneumonia, meningitis, or an ear infection      Get a shingles vaccine  if you are 60 or older, even if you have had shingles before  The shingles vaccine is an injection to protect you from the varicella-zoster virus  This is the same virus that causes chickenpox  Shingles is a painful rash that develops in people who had chickenpox or have been exposed to the virus  How to eat healthy:  My Plate is a model for planning healthy meals  It shows the types and amounts of foods that should go on your plate  Fruits and vegetables make up about half of your plate, and grains and protein make up the other half  A serving of dairy is included on the side of your plate  The amount of calories and serving sizes you need depends on your age, gender, weight, and height  Examples of healthy foods are listed below:  Eat a variety of vegetables  such as dark green, red, and orange vegetables  You can also include canned vegetables low in sodium (salt) and frozen vegetables without added butter or sauces  Eat a variety of fresh fruits , canned fruit in 100% juice, frozen fruit, and dried fruit  Include whole grains  At least half of the grains you eat should be whole grains  Examples include whole-wheat bread, wheat pasta, brown rice, and whole-grain cereals such as oatmeal     Eat a variety of protein foods such as seafood (fish and shellfish), lean meat, and poultry without skin (turkey and chicken)  Examples of lean meats include pork leg, shoulder, or tenderloin, and beef round, sirloin, tenderloin, and extra lean ground beef  Other protein foods include eggs and egg substitutes, beans, peas, soy products, nuts, and seeds  Choose low-fat dairy products such as skim or 1% milk or low-fat yogurt, cheese, and cottage cheese  Limit unhealthy fats  such as butter, hard margarine, and shortening  Exercise:  Exercise at least 30 minutes per day on most days of the week  Some examples of exercise include walking, biking, dancing, and swimming   You can also fit in more physical activity by taking the stairs instead of the elevator or parking farther away from stores  Include muscle strengthening activities 2 days each week  Regular exercise provides many health benefits  It helps you manage your weight, and decreases your risk for type 2 diabetes, heart disease, stroke, and high blood pressure  Exercise can also help improve your mood  Ask your healthcare provider about the best exercise plan for you  General health and safety guidelines:   Do not smoke  Nicotine and other chemicals in cigarettes and cigars can cause lung damage  Ask your healthcare provider for information if you currently smoke and need help to quit  E-cigarettes or smokeless tobacco still contain nicotine  Talk to your healthcare provider before you use these products  Limit alcohol  A drink of alcohol is 12 ounces of beer, 5 ounces of wine, or 1½ ounces of liquor  Lose weight, if needed  Being overweight increases your risk of certain health conditions  These include heart disease, high blood pressure, type 2 diabetes, and certain types of cancer  Protect your skin  Do not sunbathe or use tanning beds  Use sunscreen with a SPF 15 or higher  Apply sunscreen at least 15 minutes before you go outside  Reapply sunscreen every 2 hours  Wear protective clothing, hats, and sunglasses when you are outside  Drive safely  Always wear your seatbelt  Make sure everyone in your car wears a seatbelt  A seatbelt can save your life if you are in an accident  Do not use your cell phone when you are driving  This could distract you and cause an accident  Pull over if you need to make a call or send a text message  Practice safe sex  Use latex condoms if are sexually active and have more than one partner  Your healthcare provider may recommend screening tests for sexually transmitted infections (STIs)  Wear helmets, lifejackets, and protective gear    Always wear a helmet when you ride a bike or motorcycle, go skiing, or play sports that could cause a head injury  Wear protective equipment when you play sports  Wear a lifejacket when you are on a boat or doing water sports  © Copyright [employer] 2022 Information is for End User's use only and may not be sold, redistributed or otherwise used for commercial purposes  The above information is an  only  It is not intended as medical advice for individual conditions or treatments  Talk to your doctor, nurse or pharmacist before following any medical regimen to see if it is safe and effective for you  Weight Management   AMBULATORY CARE:   Why it is important to manage your weight:  Being overweight increases your risk of health conditions such as heart disease, high blood pressure, type 2 diabetes, and certain types of cancer  It can also increase your risk for osteoarthritis, sleep apnea, and other respiratory problems  Aim for a slow, steady weight loss  Even a small amount of weight loss can lower your risk of health problems  Risks of being overweight:  Extra weight can cause many health problems, including the following:  Diabetes (high blood sugar level)    High blood pressure or high cholesterol    Heart disease    Stroke    Gallbladder or liver disease    Cancer of the colon, breast, prostate, liver, or kidney    Sleep apnea    Arthritis or gout    Screening  is done to check for health conditions before you have signs or symptoms  If you are 28to 79years old, your blood sugar level may be checked every 3 years for signs of prediabetes or diabetes  Your healthcare provider will check your blood pressure at each visit  High blood pressure can lead to a stroke or other problems  Your provider may check for signs of heart disease, cancer, or other health problems  How to lose weight safely:  A safe and healthy way to lose weight is to eat fewer calories and get regular exercise  You can lose up about 1 pound a week by decreasing the number of calories you eat by 500 calories each day  You can decrease calories by eating smaller portion sizes or by cutting out high-calorie foods  Read labels to find out how many calories are in the foods you eat  You can also burn calories with exercise such as walking, swimming, or biking  You will be more likely to keep weight off if you make these changes part of your lifestyle  Exercise at least 30 minutes per day on most days of the week  You can also fit in more physical activity by taking the stairs instead of the elevator or parking farther away from stores  Ask your healthcare provider about the best exercise plan for you  Healthy meal plan for weight management:  A healthy meal plan includes a variety of foods, contains fewer calories, and helps you stay healthy  A healthy meal plan includes the following:     Eat whole-grain foods more often  A healthy meal plan should contain fiber  Fiber is the part of grains, fruits, and vegetables that is not broken down by your body  Whole-grain foods are healthy and provide extra fiber in your diet  Some examples of whole-grain foods are whole-wheat breads and pastas, oatmeal, brown rice, and bulgur  Eat a variety of vegetables every day  Include dark, leafy greens such as spinach, kale, allison greens, and mustard greens  Eat yellow and orange vegetables such as carrots, sweet potatoes, and winter squash  Eat a variety of fruits every day  Choose fresh or canned fruit (canned in its own juice or light syrup) instead of juice  Fruit juice has very little or no fiber  Eat low-fat dairy foods  Drink fat-free (skim) milk or 1% milk  Eat fat-free yogurt and low-fat cottage cheese  Try low-fat cheeses such as mozzarella and other reduced-fat cheeses  Choose meat and other protein foods that are low in fat  Choose beans or other legumes such as split peas or lentils  Choose fish, skinless poultry (chicken or turkey), or lean cuts of red meat (beef or pork)   Before you cook meat or poultry, cut off any visible fat  Use less fat and oil  Try baking foods instead of frying them  Add less fat, such as margarine, sour cream, regular salad dressing and mayonnaise to foods  Eat fewer high-fat foods  Some examples of high-fat foods include french fries, doughnuts, ice cream, and cakes  Eat fewer sweets  Limit foods and drinks that are high in sugar  This includes candy, cookies, regular soda, and sweetened drinks  Ways to decrease calories:   Eat smaller portions  Use a small plate with smaller servings  Do not eat second helpings  When you eat at a restaurant, ask for a box and place half of your meal in the box before you eat  Share an entrée with someone else  Replace high-calorie snacks with healthy, low-calorie snacks  Choose fresh fruit, vegetables, fat-free rice cakes, or air-popped popcorn instead of potato chips, nuts, or chocolate  Choose water or calorie-free drinks instead of soda or sweetened drinks  Do not shop for groceries when you are hungry  You may be more likely to make unhealthy food choices  Take a grocery list of healthy foods and shop after you have eaten  Eat regular meals  Do not skip meals  Skipping meals can lead to overeating later in the day  This can make it harder for you to lose weight  Eat a healthy snack in place of a meal if you do not have time to eat a regular meal  Talk with a dietitian to help you create a meal plan and schedule that is right for you  Other things to consider as you try to lose weight:   Be aware of situations that may give you the urge to overeat, such as eating while watching television  Find ways to avoid these situations  For example, read a book, go for a walk, or do crafts  Meet with a weight loss support group or friends who are also trying to lose weight  This may help you stay motivated to continue working on your weight loss goals      © Copyright [employer] 2022 Information is for End User's use only and may not be sold, redistributed or otherwise used for commercial purposes  The above information is an  only  It is not intended as medical advice for individual conditions or treatments  Talk to your doctor, nurse or pharmacist before following any medical regimen to see if it is safe and effective for you

## 2023-05-22 NOTE — ASSESSMENT & PLAN NOTE
Obtain blood work as discussed  To decrease carbohydrates and added sugar  Research medications that aid in weight loss such as semaglutide, liraglutide, Qsymia, Contrave, phentermine, orlistat

## 2023-05-22 NOTE — PROGRESS NOTES
[facility] [address]     NAME: Sharonda Sender  AGE: 28 y o  SEX: female  : 1990     DATE: 2023     Assessment and Plan:     Problem List Items Addressed This Visit        Other    Seasonal allergies     Has been using Zyrtec without significant relief  We will try Xyzal 5 mg daily with dinner and Flonase in the morning  Relevant Medications    levocetirizine (XYZAL) 5 MG tablet    fluticasone (FLONASE) 50 mcg/act nasal spray    Weight gain     Has gained about 20 pounds in 6 months  Admits to poor diet in the past that mostly included carbs and prepackaged food  Encouraged to research intermittent fasting, decrease carbohydrates and added sugar  Start walking at least 3 times a week for 30 minutes at a time  Relevant Orders    CBC and differential    Comprehensive metabolic panel    Hemoglobin A1C    Lipid panel    TSH, 3rd generation with Free T4 reflex    Vitamin D 25 hydroxy    Iron Panel (Includes Ferritin, Iron Sat%, Iron, and TIBC)    Vitamin B12    Class 3 severe obesity due to excess calories without serious comorbidity with body mass index (BMI) of 40 0 to 44 9 in [facility])     Obtain blood work as discussed  To decrease carbohydrates and added sugar  Research medications that aid in weight loss such as semaglutide, liraglutide, Qsymia, Contrave, phentermine, orlistat            Relevant Orders    CBC and differential    Comprehensive metabolic panel    Hemoglobin A1C    Lipid panel    TSH, 3rd generation with Free T4 reflex    Vitamin D 25 hydroxy    Iron Panel (Includes Ferritin, Iron Sat%, Iron, and TIBC)    Vitamin B12   Other Visit Diagnoses     Annual physical exam    -  Primary    Screening for diabetes mellitus        Relevant Orders    Hemoglobin A1C    Screening for cardiovascular condition        Relevant Orders    Lipid panel          Immunizations and preventive care screenings were discussed with patient today  Appropriate education was printed on patient's after visit summary  Counseling:  Alcohol/drug use: discussed moderation in alcohol intake, the recommendations for healthy alcohol use, and avoidance of illicit drug use  Dental Health: discussed importance of regular tooth brushing, flossing, and dental visits  Injury prevention: discussed safety/seat belts, safety helmets, smoke detectors, carbon dioxide detectors, and smoking near bedding or upholstery  Sexual health: discussed sexually transmitted diseases, partner selection, use of condoms, avoidance of unintended pregnancy, and contraceptive alternatives  · Exercise: the importance of regular exercise/physical activity was discussed  Recommend exercise 3-5 times per week for at least 30 minutes  BMI Counseling: Body mass index is 43 59 kg/m²  The BMI is above normal  Nutrition recommendations include decreasing portion sizes, encouraging healthy choices of fruits and vegetables, decreasing fast food intake, consuming healthier snacks, limiting drinks that contain sugar, moderation in carbohydrate intake, increasing intake of lean protein, reducing intake of saturated and trans fat and reducing intake of cholesterol  Exercise recommendations include moderate physical activity 150 minutes/week and exercising 3-5 times per week  Rationale for BMI follow-up plan is due to patient being overweight or obese  Depression Screening and Follow-up Plan: Patient was screened for depression during today's encounter  They screened negative with a PHQ-2 score of 0  Return in 2 weeks (on 6/5/2023) for  Weight loss follow up w blood work   Chief Complaint:     Chief Complaint   Patient presents with   • Physical Exam      History of Present Illness:     Adult Annual Physical   Patient here for a comprehensive physical exam  The patient reports problems - sesonall allergies, weight gain       Diet and Physical Activity  · Diet/Nutrition: poor diet  · Exercise: no formal exercise  Depression Screening  PHQ-2/9 Depression Screening    Little interest or pleasure in doing things: 0 - not at all  Feeling down, depressed, or hopeless: 0 - not at all  PHQ-2 Score: 0  PHQ-2 Interpretation: Negative depression screen       General Health  · Sleep: sleeps well and gets 7-8 hours of sleep on average  · Hearing: normal - bilateral   · Vision: no vision problems and most recent eye exam >1 year ago  · Dental: no dental visits for >1 year and brushes teeth twice daily  /GYN Health  · Last menstrual period: 5/15/23  · Contraceptive method: tubulagation  · History of STDs?: no      Review of Systems:     Review of Systems   Constitutional: Positive for unexpected weight change (weight gain )  Negative for chills and fever  Respiratory: Negative for cough and shortness of breath  Cardiovascular: Negative for chest pain  Gastrointestinal: Negative for abdominal pain and vomiting  Genitourinary: Negative for dysuria  Musculoskeletal: Negative for arthralgias and back pain  Allergic/Immunologic: Positive for environmental allergies  Neurological: Negative for headaches  Psychiatric/Behavioral: Negative for dysphoric mood and sleep disturbance  The patient is not nervous/anxious  All other systems reviewed and are negative       Past Medical History:     Past Medical History:   Diagnosis Date   • Anemia     with pregnancy on iron   • Asthma     last used inhaler one year ago   • Hypertension    • Migraine    • UTI (urinary tract infection)    • Varicella     has disease as child      Past Surgical History:     Past Surgical History:   Procedure Laterality Date   •  SECTION     • HERNIA REPAIR     • INDUCED      • [place]  DELIVERY ONLY N/A 2016    Procedure:  SECTION () REPEAT;  Surgeon: Eliot Edmondson MD;  Location: [facility];  Service: Obstetrics   • MN  DELIVERY ONLY N/A 10/30/2020    Procedure:  SECTION () REPEAT;  Surgeon: Karla Edouard MD;  [facility];  Service: Obstetrics   • SKIN TAG REMOVAL     • TUBAL LIGATION Bilateral 10/30/2020    Procedure: LIGATION/COAGULATION TUBAL;  Surgeon: Karla Edouard MD;  [facility];  Service: Obstetrics      Social History:     Social History     Socioeconomic History   • Marital status: Single     Spouse name: None   • Number of children: None   • Years of education: None   • Highest education level: None   Occupational History   • None   Tobacco Use   • Smoking status: Never   • Smokeless tobacco: Never   Vaping Use   • Vaping Use: Never used   Substance and Sexual Activity   • Alcohol use: Yes     Comment: Once every 6 months   • Drug use: Never   • Sexual activity: Yes     Partners: Male     Birth control/protection: Female Sterilization   Other Topics Concern   • None   Social History Narrative   • None     Social Determinants of Health     Financial Resource Strain: Not on file   Food Insecurity: Not on file   Transportation Needs: Not on file   Physical Activity: Not on file   Stress: Not on file   Social Connections: Not on file   Intimate Partner Violence: Not on file   Housing Stability: Not on file      Family History:     Family History   Problem Relation Age of Onset   • Diabetes Maternal Aunt         type 1   • No Known Problems Mother    • Asthma Father    • No Known Problems Daughter    • No Known Problems Maternal Grandmother    • No Known Problems Maternal Grandfather    • Asthma Paternal Grandmother    • No Known Problems Daughter       Current Medications:     Current Outpatient Medications   Medication Sig Dispense Refill   • albuterol (ProAir HFA) 90 mcg/act inhaler Inhale 2 puffs every 6 (six) hours as needed for wheezing 8 5 g 0   • fluticasone (FLONASE) 50 mcg/act nasal spray 1 spray into each nostril daily 16 g 1   • levocetirizine (XYZAL) 5 MG tablet Take 1 tablet (5 "mg total) by mouth every evening 30 tablet 1   • Multiple Vitamin (multivitamin) tablet Take 1 tablet by mouth daily       No current facility-administered medications for this visit  Allergies: Allergies   Allergen Reactions   • Cat Hair Extract    • Penicillins Other (See Comments)     Unknown reaction  • Pollen Extract       Physical Exam:     /80   Pulse (!) 108   Temp 98 °F (36 7 °C)   Resp 16   Ht 5' 2 5\" (1 588 m)   Wt 110 kg (242 lb 3 2 oz)   SpO2 98%   BMI 43 59 kg/m²     Physical Exam  Vitals and nursing note reviewed  Constitutional:       General: She is not in acute distress  Appearance: Normal appearance  She is well-developed  She is obese  She is not ill-appearing  HENT:      Head: Normocephalic and atraumatic  Right Ear: Tympanic membrane, ear canal and external ear normal       Left Ear: Tympanic membrane, ear canal and external ear normal       Nose: Nose normal  No congestion or rhinorrhea  Eyes:      Conjunctiva/sclera: Conjunctivae normal       Pupils: Pupils are equal, round, and reactive to light  Cardiovascular:      Rate and Rhythm: Normal rate and regular rhythm  Heart sounds: Normal heart sounds  No murmur heard  Pulmonary:      Effort: Pulmonary effort is normal  No respiratory distress  Breath sounds: Normal breath sounds  Abdominal:      General: Bowel sounds are normal  There is no distension  Palpations: Abdomen is soft  Tenderness: There is no abdominal tenderness  Musculoskeletal:         General: No swelling or tenderness  Normal range of motion  Cervical back: Normal range of motion  No tenderness  Right lower leg: No edema  Left lower leg: No edema  Lymphadenopathy:      Cervical: No cervical adenopathy  Skin:     General: Skin is warm and dry  Capillary Refill: Capillary refill takes less than 2 seconds     Neurological:      Mental Status: She is alert and oriented to person, place, and " time       Cranial Nerves: No cranial nerve deficit  Motor: No weakness  Gait: Gait normal    Psychiatric:         Mood and Affect: Mood normal          Behavior: Behavior normal          Thought Content:  Thought content normal          Judgment: Judgment normal           Yisel Tolentino, [facility] [address] [facility]

## 2023-06-05 ENCOUNTER — OFFICE VISIT (OUTPATIENT)
Dept: FAMILY MEDICINE CLINIC | Facility: CLINIC | Age: 33
End: 2023-06-05
Payer: COMMERCIAL

## 2023-06-05 VITALS
OXYGEN SATURATION: 97 % | BODY MASS INDEX: 43.8 KG/M2 | HEIGHT: 63 IN | RESPIRATION RATE: 16 BRPM | DIASTOLIC BLOOD PRESSURE: 80 MMHG | HEART RATE: 114 BPM | SYSTOLIC BLOOD PRESSURE: 128 MMHG | WEIGHT: 247.2 LBS | TEMPERATURE: 99.3 F

## 2023-06-05 DIAGNOSIS — E55.9 VITAMIN D DEFICIENCY: ICD-10-CM

## 2023-06-05 DIAGNOSIS — E66.01 CLASS 3 SEVERE OBESITY DUE TO EXCESS CALORIES WITHOUT SERIOUS COMORBIDITY WITH BODY MASS INDEX (BMI) OF 40.0 TO 44.9 IN ADULT (HCC): ICD-10-CM

## 2023-06-05 DIAGNOSIS — J30.2 SEASONAL ALLERGIES: ICD-10-CM

## 2023-06-05 DIAGNOSIS — E53.8 B12 DEFICIENCY: ICD-10-CM

## 2023-06-05 DIAGNOSIS — R63.5 WEIGHT GAIN: Primary | ICD-10-CM

## 2023-06-05 DIAGNOSIS — D50.8 IRON DEFICIENCY ANEMIA SECONDARY TO INADEQUATE DIETARY IRON INTAKE: ICD-10-CM

## 2023-06-05 PROCEDURE — 99214 OFFICE O/P EST MOD 30 MIN: CPT

## 2023-06-05 NOTE — ASSESSMENT & PLAN NOTE
Recent blood work discussed  Start taking Vitron-C every other day  We will recheck blood work in 3 months

## 2023-06-05 NOTE — PROGRESS NOTES
Assessment/Plan:         Problem List Items Addressed This Visit        Other    Seasonal allergies     Improved on xyzal daily          Weight gain - Primary    Relevant Medications    Phentermine HCl 8 MG TABS    Class 3 severe obesity due to excess calories without serious comorbidity with body mass index (BMI) of 40 0 to 44 9 in adult [facility])     Recent blood work discussed  Emmanuelosito Peterson has tried incorporating intermittent fasting and for diet  Cody making healthier food choices and eliminating carbohydrates, added sugar and prepackaged foods  We will initiate phentermine 8 mg daily  Possible side effects discussed  Patient verbalizes understanding  Return in 4 weeks for follow-up  Relevant Medications    Phentermine HCl 8 MG TABS    B12 deficiency     Recent blood work discussed  She will check if her insurance covers B12 injections and let me know  Iron deficiency anemia secondary to inadequate dietary iron intake     Recent blood work discussed  Start taking Vitron-C every other day  We will recheck blood work in 3 months  Vitamin D deficiency     Continue with MND supplementation  50,000 units weekly for 12 weeks, then 5000 daily  Subjective:      Patient ID: Valentín Clifford is a 28 y o  female  Patient presents to the office in order to discuss recent blood work  The following portions of the patient's history were reviewed and updated as appropriate:   Past Medical History:  She has a past medical history of Anemia, Asthma, Hypertension, Migraine, UTI (urinary tract infection), and Varicella  ,  _______________________________________________________________________  Medical Problems:  does not have any pertinent problems on file ,  _______________________________________________________________________  Past Surgical History:   has a past surgical history that includes  section;  Hernia repair; Skin tag removal; Induced ; pr  delivery only (N/A, 2016); pr  delivery only (N/A, 10/30/2020); and Tubal ligation (Bilateral, 10/30/2020)  ,  _______________________________________________________________________  Family History:  family history includes Asthma in her father and paternal grandmother; Diabetes in her maternal aunt; No Known Problems in her daughter, daughter, maternal grandfather, maternal grandmother, and mother ,  _______________________________________________________________________  Social History:   reports that she has never smoked  She has never used smokeless tobacco  She reports current alcohol use  She reports that she does not use drugs  ,  _______________________________________________________________________  Allergies:  is allergic to cat hair extract, penicillins, and pollen extract     _______________________________________________________________________  Current Outpatient Medications   Medication Sig Dispense Refill   • albuterol (ProAir HFA) 90 mcg/act inhaler Inhale 2 puffs every 6 (six) hours as needed for wheezing 8 5 g 0   • ergocalciferol (VITAMIN D2) 50,000 units Take 1 capsule (50,000 Units total) by mouth once a week 12 capsule 0   • fluticasone (FLONASE) 50 mcg/act nasal spray 1 spray into each nostril daily 16 g 1   • Iron-Vitamin C  MG TABS Take 1 tablet by mouth in the morning 90 tablet 1   • levocetirizine (XYZAL) 5 MG tablet Take 1 tablet (5 mg total) by mouth every evening 30 tablet 1   • Multiple Vitamin (multivitamin) tablet Take 1 tablet by mouth daily     • Phentermine HCl 8 MG TABS Take 1 tablet (8 mg total) by mouth in the morning 28 tablet 0     No current facility-administered medications for this visit      _______________________________________________________________________  Review of Systems   Constitutional: Positive for fatigue and unexpected weight change (weight gain )  Negative for chills and fever  Respiratory: Negative for cough and shortness of breath  "  Cardiovascular: Negative for chest pain  Gastrointestinal: Negative for abdominal pain and vomiting  Genitourinary: Negative for dysuria  Musculoskeletal: Negative for arthralgias and back pain  Allergic/Immunologic: Positive for environmental allergies (improved)  Neurological: Negative for headaches  Psychiatric/Behavioral: Negative for dysphoric mood and sleep disturbance  The patient is not nervous/anxious  All other systems reviewed and are negative  Objective:  Vitals:    06/05/23 1413   BP: 128/80   Pulse: (!) 114   Resp: 16   Temp: 99 3 °F (37 4 °C)   SpO2: 97%   Weight: 112 kg (247 lb 3 2 oz)   Height: 5' 2 5\" (1 588 m)     Body mass index is 44 49 kg/m²  Physical Exam  Vitals and nursing note reviewed  Constitutional:       General: She is not in acute distress  Appearance: Normal appearance  She is obese  She is not ill-appearing  Cardiovascular:      Rate and Rhythm: Normal rate and regular rhythm  Heart sounds: Normal heart sounds  Pulmonary:      Effort: Pulmonary effort is normal       Breath sounds: Normal breath sounds  Musculoskeletal:         General: Normal range of motion  Skin:     General: Skin is warm and dry  Neurological:      Mental Status: She is alert and oriented to person, place, and time  Psychiatric:         Mood and Affect: Mood normal          Behavior: Behavior normal          Thought Content: Thought content normal          Judgment: Judgment normal                Portions of the above record have been created with voice recognition software  Occasional wrong word or \"sound alike\" substitution may have occurred due to the inherent limitations of voice recognition software  Read the chart carefully and recognize, using context, where substitution may have occurred    "

## 2023-06-05 NOTE — ASSESSMENT & PLAN NOTE
Recent blood work discussed  Eileen Byrnes has tried incorporating intermittent fasting and for diet  Cody making healthier food choices and eliminating carbohydrates, added sugar and prepackaged foods  We will initiate phentermine 8 mg daily  Possible side effects discussed  Patient verbalizes understanding  Return in 4 weeks for follow-up

## 2023-06-05 NOTE — PATIENT INSTRUCTIONS
Healthy eating habits for metabolic balance and weight loss      Create healthy habits:    Aim for 1500 -1200 calories a day depending on activity level  Aim for 40% fat, 40% protein, 20% carbs - track it on the lucian   Drink plenty of water - half of your body weight in oz   Aim for 8h of sleep per night   Exercise minimum of 3 days per week - at least 30min per day   Consider implementing intermittent fasting and start by fasting for 12h and extend to 16h    Some categories of food have consistently been found to be health-promoting and associated with a lower risk of chronic disease and healthy weight  These include:   Vegetables  Whole fruits (not juices)   Legumes (beans, lentils, peas, chickpeas, tofu, tempeh, edamame)   Whole grains (oats, whole wheat, brown rice, barley, etc)  Nuts ands seeds  Fish that are rich in omega-3 fatty acids    Healthier eating patterns that focus more on plant-based foods and less on processed foods have been shown in many prospective observational studies to improve health outcomes  Foods to AVOID:   Processed meats (such as pepperoni, cold cuts, deli meats, etc )  Added sugars (as found in sugar-sweetened beverages, most commercial breakfast cereals, many breads, desserts, many ultraprocessed foods)  Refined grains (e g , white rice, “white” breads, rolls, crackers, and other foods made with processed grains; note that “multigrain” does not mean whole grain - it usually means multiple refined grains)  Ultraprocessed foods (e g , most commercial snack foods, chips, crackers, etc )    Ultraprocessed foods are especially a concern because they are very calorie dense, hyperpalatable, lack fiber, and have additives not found in nature        Supplement recommendations for metabolic balance    Start with basic supplement support:   ONE high-quality/potency multivitamin: every other day for micronutrient support that can help to balance blood sugar   Magnesium Glycinate: nightly for mineral support  Magnesium insufficiency is associated with an increased risk of type 2 diabetes, whereas magnesium supplementation improves insulin sensitivity and blood sugar control  Pure BiOme Intensive Probiotic or UltraFlora Spectrum: daily microbiome support that contains the most well-studied strains that demonstrate an anti-diabetic effect  Berberine: To improve metabolic imbalance 3xtimes daily with meals   Sanjiv, Green Tea/Matcha daily   Psyllium daily     Some of the weight loss apps to look into:    Lose It! -- free weight loss lucian builds a custom nutrition plan for your weight loss goals  It uses your information to create a daily calorie guide and has a food tracking log so you can keep track of your progress  MyFitnessPal --free weight loss lucian to track calories and food intake  Their food tracking database boats over 5 million existing food options to choose from, and they integrate restaurants too  Going out to eat can make food logging incredibly difficult, and MyFitnessPal helps make that process a little bit easier  StepBet -- is another free weight loss lucian, but this one has a twist  Instead of simply logging your own activity, in StepBet, you join fitness challenges and bet  That’s right, you roberts on your own weight loss success rate  For those of us who need some extra motivation and accountability, this is a pretty cool concept  Fooducate -- is a free weight loss lucian that heads to the root of weight gain, the grocery store  While a lot of other apps focus on exercise and food tracking, Fooducate is one of the few trying to help you make better food choices from the beginning  You can scan items while you’re at the store to check out information on nutrition and warnings, like gross ingredients that are hidden under other names  Sweatcoin -- is the weight loss lucian that pays you to lose weight  Sweatcoin tracks all of your outdoor steps toward rewards   They have their own points currency and you can earn things like subscriptions to health related apps, like Calm, or even help from a virtual   If point systems get you motivated, Sweatcoin may be the perfect free weight loss lucian for you

## 2023-06-17 DIAGNOSIS — J30.2 SEASONAL ALLERGIES: ICD-10-CM

## 2023-06-17 RX ORDER — FLUTICASONE PROPIONATE 50 MCG
SPRAY, SUSPENSION (ML) NASAL
Qty: 48 ML | Refills: 1 | Status: SHIPPED | OUTPATIENT
Start: 2023-06-17

## 2023-06-17 RX ORDER — LEVOCETIRIZINE DIHYDROCHLORIDE 5 MG/1
TABLET, FILM COATED ORAL
Qty: 90 TABLET | Refills: 1 | Status: SHIPPED | OUTPATIENT
Start: 2023-06-17

## 2023-07-11 DIAGNOSIS — J30.2 SEASONAL ALLERGIES: ICD-10-CM

## 2023-07-11 RX ORDER — FLUTICASONE PROPIONATE 50 MCG
SPRAY, SUSPENSION (ML) NASAL
Qty: 48 ML | Refills: 2 | Status: SHIPPED | OUTPATIENT
Start: 2023-07-11

## 2023-07-12 ENCOUNTER — OFFICE VISIT (OUTPATIENT)
Dept: FAMILY MEDICINE CLINIC | Facility: CLINIC | Age: 33
End: 2023-07-12
Payer: COMMERCIAL

## 2023-07-12 VITALS
WEIGHT: 245 LBS | HEART RATE: 112 BPM | SYSTOLIC BLOOD PRESSURE: 110 MMHG | TEMPERATURE: 97.2 F | BODY MASS INDEX: 43.41 KG/M2 | OXYGEN SATURATION: 98 % | DIASTOLIC BLOOD PRESSURE: 80 MMHG | HEIGHT: 63 IN

## 2023-07-12 DIAGNOSIS — E66.01 CLASS 3 SEVERE OBESITY DUE TO EXCESS CALORIES WITHOUT SERIOUS COMORBIDITY WITH BODY MASS INDEX (BMI) OF 40.0 TO 44.9 IN ADULT (HCC): Primary | ICD-10-CM

## 2023-07-12 DIAGNOSIS — R63.5 WEIGHT GAIN: ICD-10-CM

## 2023-07-12 DIAGNOSIS — E53.8 B12 DEFICIENCY: ICD-10-CM

## 2023-07-12 PROCEDURE — 99214 OFFICE O/P EST MOD 30 MIN: CPT

## 2023-07-12 PROCEDURE — 96372 THER/PROPH/DIAG INJ SC/IM: CPT

## 2023-07-12 RX ORDER — CYANOCOBALAMIN 1000 UG/ML
1000 INJECTION, SOLUTION INTRAMUSCULAR; SUBCUTANEOUS WEEKLY
Status: SHIPPED | OUTPATIENT
Start: 2023-07-12

## 2023-07-12 RX ORDER — PHENTERMINE HYDROCHLORIDE 15 MG/1
15 CAPSULE ORAL EVERY MORNING
Qty: 30 CAPSULE | Refills: 0 | Status: SHIPPED | OUTPATIENT
Start: 2023-07-12

## 2023-07-12 RX ADMIN — CYANOCOBALAMIN 1000 MCG: 1000 INJECTION, SOLUTION INTRAMUSCULAR; SUBCUTANEOUS at 15:52

## 2023-07-12 NOTE — ASSESSMENT & PLAN NOTE
Weight is down from last visit. We will increase phentermine to 15 mg daily. Discussed medication desired effects, potential side effects, and how to administer the medication. Non-pharmacological interventions such as intermittent fasting, low carb diet, high in vegetables and fruit discussed. Educated on importance of physical activity. Congratulated patient on continued results. Follow up in 4 weeks or sooner if needed. Patient verbalizes understanding regarding plan of care and all questions answered.

## 2023-07-12 NOTE — PROGRESS NOTES
Assessment/Plan:         Problem List Items Addressed This Visit        Other    Weight gain     Continue to make healthy food choices and stay active. Continue on Atkins protein shakes. Relevant Medications    phentermine 15 MG capsule    Class 3 severe obesity due to excess calories without serious comorbidity with body mass index (BMI) of 40.0 to 44.9 in adult [facility]) - Primary     Weight is down from last visit. We will increase phentermine to 15 mg daily. Discussed medication desired effects, potential side effects, and how to administer the medication. Non-pharmacological interventions such as intermittent fasting, low carb diet, high in vegetables and fruit discussed. Educated on importance of physical activity. Congratulated patient on continued results. Follow up in 4 weeks or sooner if needed. Patient verbalizes understanding regarding plan of care and all questions answered. Relevant Medications    phentermine 15 MG capsule    B12 deficiency     We will initiate B12 injections today. Come back weekly for the next 3 weeks then every 3 weeks thereafter. Patient verbalizes understanding. Relevant Medications    cyanocobalamin injection 1,000 mcg         Subjective:      Patient ID: Nu Garcia is a 28 y.o. female. Presents for follow up of obesity/metabolic syndrome. Last weight was 247lb on 6/5/23. Taking phentermine and tolerating well and denies any adverse side effects. Usual diet includes atkins protein shakes BID. Exercise includes walking/more active with family 7 days a week. Tolerating exercise without complaint. Identities no barriers as weight loss barriers. Reports support system which includes family and friends.         The following portions of the patient's history were reviewed and updated as appropriate:   Past Medical History:  She has a past medical history of Anemia, Asthma, Hypertension, Migraine, UTI (urinary tract infection), and Varicella. ,  _______________________________________________________________________  Medical Problems:  does not have any pertinent problems on file.,  _______________________________________________________________________  Past Surgical History:   has a past surgical history that includes  section; Hernia repair; Skin tag removal; Induced ; pr  delivery only (N/A, 2016); pr  delivery only (N/A, 10/30/2020); and Tubal ligation (Bilateral, 10/30/2020). ,  _______________________________________________________________________  Family History:  family history includes Asthma in her father and paternal grandmother; Diabetes in her maternal aunt; No Known Problems in her daughter, daughter, maternal grandfather, maternal grandmother, and mother.,  _______________________________________________________________________  Social History:   reports that she has never smoked. She has never used smokeless tobacco. She reports current alcohol use. She reports that she does not use drugs. ,  _______________________________________________________________________  Allergies:  is allergic to cat hair extract, penicillins, and pollen extract. .  _______________________________________________________________________  Current Outpatient Medications   Medication Sig Dispense Refill   • albuterol (ProAir HFA) 90 mcg/act inhaler Inhale 2 puffs every 6 (six) hours as needed for wheezing 8.5 g 0   • ergocalciferol (VITAMIN D2) 50,000 units Take 1 capsule (50,000 Units total) by mouth once a week 12 capsule 0   • fluticasone (FLONASE) 50 mcg/act nasal spray SPRAY 1 SPRAY INTO EACH NOSTRIL EVERY DAY 48 mL 2   • Iron-Vitamin C  MG TABS Take 1 tablet by mouth in the morning 90 tablet 1   • levocetirizine (XYZAL) 5 MG tablet TAKE 1 TABLET BY MOUTH EVERY DAY IN THE EVENING 90 tablet 1   • Multiple Vitamin (multivitamin) tablet Take 1 tablet by mouth daily     • phentermine 15 MG capsule Take 1 capsule (15 mg total) by mouth every morning 30 capsule 0     Current Facility-Administered Medications   Medication Dose Route Frequency Provider Last Rate Last Admin   • cyanocobalamin injection 1,000 mcg  1,000 mcg Intramuscular Weekly ERIN Cedillo   1,000 mcg at 07/12/23 1552     _______________________________________________________________________  Review of Systems   Constitutional: Positive for fatigue (improving ) and unexpected weight change (weight gain ). Negative for chills and fever. Respiratory: Negative for cough and shortness of breath. Cardiovascular: Negative for chest pain. Gastrointestinal: Negative for abdominal pain and vomiting. Genitourinary: Negative for dysuria. Musculoskeletal: Negative for arthralgias and back pain. Allergic/Immunologic: Positive for environmental allergies (improved). Neurological: Negative for headaches. Psychiatric/Behavioral: Negative for dysphoric mood and sleep disturbance. The patient is not nervous/anxious. All other systems reviewed and are negative. Objective:  Vitals:    07/12/23 1520   BP: 110/80   BP Location: Left arm   Patient Position: Sitting   Cuff Size: Large   Pulse: (!) 112   Temp: (!) 97.2 °F (36.2 °C)   SpO2: 98%   Weight: 111 kg (245 lb)   Height: 5' 2.5" (1.588 m)     Body mass index is 44.1 kg/m². Physical Exam  Vitals and nursing note reviewed. Constitutional:       General: She is not in acute distress. Appearance: Normal appearance. She is obese. She is not ill-appearing. Cardiovascular:      Rate and Rhythm: Normal rate and regular rhythm. Heart sounds: Normal heart sounds. Pulmonary:      Effort: Pulmonary effort is normal.      Breath sounds: Normal breath sounds. Musculoskeletal:         General: Normal range of motion. Skin:     General: Skin is warm and dry. Neurological:      Mental Status: She is alert and oriented to person, place, and time.    Psychiatric:         Mood and Affect: Mood normal.         Behavior: Behavior normal.         Thought Content: Thought content normal.         Judgment: Judgment normal.               Portions of the above record have been created with voice recognition software. Occasional wrong word or "sound alike" substitution may have occurred due to the inherent limitations of voice recognition software. Read the chart carefully and recognize, using context, where substitution may have occurred.

## 2023-07-12 NOTE — ASSESSMENT & PLAN NOTE
We will initiate B12 injections today. Come back weekly for the next 3 weeks then every 3 weeks thereafter. Patient verbalizes understanding.

## 2023-07-19 ENCOUNTER — CLINICAL SUPPORT (OUTPATIENT)
Dept: FAMILY MEDICINE CLINIC | Facility: CLINIC | Age: 33
End: 2023-07-19
Payer: COMMERCIAL

## 2023-07-19 DIAGNOSIS — E53.8 B12 DEFICIENCY: Primary | ICD-10-CM

## 2023-07-19 PROCEDURE — 96372 THER/PROPH/DIAG INJ SC/IM: CPT

## 2023-07-19 RX ADMIN — CYANOCOBALAMIN 1000 MCG: 1000 INJECTION, SOLUTION INTRAMUSCULAR; SUBCUTANEOUS at 14:09

## 2023-07-25 ENCOUNTER — TELEPHONE (OUTPATIENT)
Dept: FAMILY MEDICINE CLINIC | Facility: CLINIC | Age: 33
End: 2023-07-25

## 2023-07-25 NOTE — TELEPHONE ENCOUNTER
Patient called to reschedule appointment from 8/9/23 but was wondering what will happen in regards to her medication that you refills every 4 Weeks? Please advise.   Thanks

## 2023-07-26 ENCOUNTER — CLINICAL SUPPORT (OUTPATIENT)
Dept: FAMILY MEDICINE CLINIC | Facility: CLINIC | Age: 33
End: 2023-07-26
Payer: COMMERCIAL

## 2023-07-26 DIAGNOSIS — E53.8 B12 DEFICIENCY: Primary | ICD-10-CM

## 2023-07-26 PROCEDURE — 96372 THER/PROPH/DIAG INJ SC/IM: CPT

## 2023-07-26 RX ORDER — CYANOCOBALAMIN 1000 UG/ML
1000 INJECTION, SOLUTION INTRAMUSCULAR; SUBCUTANEOUS
Status: SHIPPED | OUTPATIENT
Start: 2023-07-26

## 2023-07-26 RX ADMIN — CYANOCOBALAMIN 1000 MCG: 1000 INJECTION, SOLUTION INTRAMUSCULAR; SUBCUTANEOUS at 14:12

## 2023-07-26 NOTE — TELEPHONE ENCOUNTER
Called patient again and n/a. She is coming in today for B-12 injection.  Will try to reach her then

## 2023-08-02 ENCOUNTER — CLINICAL SUPPORT (OUTPATIENT)
Dept: FAMILY MEDICINE CLINIC | Facility: CLINIC | Age: 33
End: 2023-08-02
Payer: COMMERCIAL

## 2023-08-02 DIAGNOSIS — E53.8 B12 DEFICIENCY: Primary | ICD-10-CM

## 2023-08-02 PROCEDURE — 96372 THER/PROPH/DIAG INJ SC/IM: CPT

## 2023-08-02 RX ORDER — CYANOCOBALAMIN 1000 UG/ML
1000 INJECTION, SOLUTION INTRAMUSCULAR; SUBCUTANEOUS
Status: SHIPPED | OUTPATIENT
Start: 2023-08-02

## 2023-08-02 RX ADMIN — CYANOCOBALAMIN 1000 MCG: 1000 INJECTION, SOLUTION INTRAMUSCULAR; SUBCUTANEOUS at 14:03

## 2023-08-09 ENCOUNTER — OFFICE VISIT (OUTPATIENT)
Dept: FAMILY MEDICINE CLINIC | Facility: CLINIC | Age: 33
End: 2023-08-09
Payer: COMMERCIAL

## 2023-08-09 VITALS
WEIGHT: 242.4 LBS | OXYGEN SATURATION: 97 % | HEIGHT: 63 IN | DIASTOLIC BLOOD PRESSURE: 82 MMHG | SYSTOLIC BLOOD PRESSURE: 120 MMHG | HEART RATE: 92 BPM | BODY MASS INDEX: 42.95 KG/M2 | TEMPERATURE: 98 F

## 2023-08-09 DIAGNOSIS — E66.01 CLASS 3 SEVERE OBESITY DUE TO EXCESS CALORIES WITHOUT SERIOUS COMORBIDITY WITH BODY MASS INDEX (BMI) OF 40.0 TO 44.9 IN ADULT (HCC): ICD-10-CM

## 2023-08-09 DIAGNOSIS — R63.5 WEIGHT GAIN: ICD-10-CM

## 2023-08-09 PROCEDURE — 99213 OFFICE O/P EST LOW 20 MIN: CPT | Performed by: NURSE PRACTITIONER

## 2023-08-09 RX ORDER — PHENTERMINE HYDROCHLORIDE 15 MG/1
15 CAPSULE ORAL EVERY MORNING
Qty: 30 CAPSULE | Refills: 0 | Status: SHIPPED | OUTPATIENT
Start: 2023-08-09

## 2023-08-09 NOTE — PATIENT INSTRUCTIONS
Weight Management   AMBULATORY CARE:   Why it is important to manage your weight:  Being overweight increases your risk of health conditions such as heart disease, high blood pressure, type 2 diabetes, and certain types of cancer. It can also increase your risk for osteoarthritis, sleep apnea, and other respiratory problems. Aim for a slow, steady weight loss. Even a small amount of weight loss can lower your risk of health problems. Risks of being overweight:  Extra weight can cause many health problems, including the following:  Diabetes (high blood sugar level)    High blood pressure or high cholesterol    Heart disease    Stroke    Gallbladder or liver disease    Cancer of the colon, breast, prostate, liver, or kidney    Sleep apnea    Arthritis or gout    Screening  is done to check for health conditions before you have signs or symptoms. If you are 28to 79years old, your blood sugar level may be checked every 3 years for signs of prediabetes or diabetes. Your healthcare provider will check your blood pressure at each visit. High blood pressure can lead to a stroke or other problems. Your provider may check for signs of heart disease, cancer, or other health problems. How to lose weight safely:  A safe and healthy way to lose weight is to eat fewer calories and get regular exercise. You can lose up about 1 pound a week by decreasing the number of calories you eat by 500 calories each day. You can decrease calories by eating smaller portion sizes or by cutting out high-calorie foods. Read labels to find out how many calories are in the foods you eat. You can also burn calories with exercise such as walking, swimming, or biking. You will be more likely to keep weight off if you make these changes part of your lifestyle. Exercise at least 30 minutes per day on most days of the week.  You can also fit in more physical activity by taking the stairs instead of the elevator or parking farther away from stores. Ask your healthcare provider about the best exercise plan for you. Healthy meal plan for weight management:  A healthy meal plan includes a variety of foods, contains fewer calories, and helps you stay healthy. A healthy meal plan includes the following:     Eat whole-grain foods more often. A healthy meal plan should contain fiber. Fiber is the part of grains, fruits, and vegetables that is not broken down by your body. Whole-grain foods are healthy and provide extra fiber in your diet. Some examples of whole-grain foods are whole-wheat breads and pastas, oatmeal, brown rice, and bulgur. Eat a variety of vegetables every day. Include dark, leafy greens such as spinach, kale, allison greens, and mustard greens. Eat yellow and orange vegetables such as carrots, sweet potatoes, and winter squash. Eat a variety of fruits every day. Choose fresh or canned fruit (canned in its own juice or light syrup) instead of juice. Fruit juice has very little or no fiber. Eat low-fat dairy foods. Drink fat-free (skim) milk or 1% milk. Eat fat-free yogurt and low-fat cottage cheese. Try low-fat cheeses such as mozzarella and other reduced-fat cheeses. Choose meat and other protein foods that are low in fat. Choose beans or other legumes such as split peas or lentils. Choose fish, skinless poultry (chicken or turkey), or lean cuts of red meat (beef or pork). Before you cook meat or poultry, cut off any visible fat. Use less fat and oil. Try baking foods instead of frying them. Add less fat, such as margarine, sour cream, regular salad dressing and mayonnaise to foods. Eat fewer high-fat foods. Some examples of high-fat foods include french fries, doughnuts, ice cream, and cakes. Eat fewer sweets. Limit foods and drinks that are high in sugar. This includes candy, cookies, regular soda, and sweetened drinks. Ways to decrease calories:   Eat smaller portions.      Use a small plate with smaller servings. Do not eat second helpings. When you eat at a restaurant, ask for a box and place half of your meal in the box before you eat. Share an entrée with someone else. Replace high-calorie snacks with healthy, low-calorie snacks. Choose fresh fruit, vegetables, fat-free rice cakes, or air-popped popcorn instead of potato chips, nuts, or chocolate. Choose water or calorie-free drinks instead of soda or sweetened drinks. Do not shop for groceries when you are hungry. You may be more likely to make unhealthy food choices. Take a grocery list of healthy foods and shop after you have eaten. Eat regular meals. Do not skip meals. Skipping meals can lead to overeating later in the day. This can make it harder for you to lose weight. Eat a healthy snack in place of a meal if you do not have time to eat a regular meal. Talk with a dietitian to help you create a meal plan and schedule that is right for you. Other things to consider as you try to lose weight:   Be aware of situations that may give you the urge to overeat, such as eating while watching television. Find ways to avoid these situations. For example, read a book, go for a walk, or do crafts. Meet with a weight loss support group or friends who are also trying to lose weight. This may help you stay motivated to continue working on your weight loss goals. © Copyright [employer] 2022 Information is for End User's use only and may not be sold, redistributed or otherwise used for commercial purposes. The above information is an  only. It is not intended as medical advice for individual conditions or treatments. Talk to your doctor, nurse or pharmacist before following any medical regimen to see if it is safe and effective for you.

## 2023-08-09 NOTE — ASSESSMENT & PLAN NOTE
Patient continues to lose half pound to 1 pound per week. Currently on phentermine 15 mg. Tolerating dose. At this time we will continue on current dose since patient is still losing weight. Encouraged to continue with dietary changes. Reminded patient not to skip meals, should have intake of 1000 to 1200 kenny/day. To attempt to increase physical activity as tolerated. Return in 4 weeks for reevaluation.

## 2023-08-09 NOTE — PROGRESS NOTES
Name: Anitha Diaz      : 1990      MRN: [id number]  Encounter Provider: ERIN Man  Encounter Date: 2023   Encounter department: [address] [place] [address]     1. Class 3 severe obesity due to excess calories without serious comorbidity with body mass index (BMI) of 40.0 to 44.9 in adult [facility])  Assessment & Plan:  Patient continues to lose half pound to 1 pound per week. Currently on phentermine 15 mg. Tolerating dose. At this time we will continue on current dose since patient is still losing weight. Encouraged to continue with dietary changes. Reminded patient not to skip meals, should have intake of 1000 to 1200 kenny/day. To attempt to increase physical activity as tolerated. Return in 4 weeks for reevaluation. Orders:  -     phentermine 15 MG capsule; Take 1 capsule (15 mg total) by mouth every morning    2. Weight gain  -     phentermine 15 MG capsule; Take 1 capsule (15 mg total) by mouth every morning         Subjective      Patient presents to the office for 4-week check-up on phentermine. Provider increased dose to 15 mg during last visit. As per patient, has been tolerating increase in dose well. Patient denies adverse effects from medication. Patient denies palpitations, diarrhea, jitteriness. Patient states current dose of phentermine is making her less hungry. Patient notes that she is engaging in portion control, snacking less. As per patient, usually has an Atkins protein shake for breakfast, admits to not eating the healthiest lunch, and also admits to at times not eating dinner due to not being hungry. Patient states she has not began going to a gym or engaging in additional physical activity, but states she has more energy now and is able to keep up with her children. Review of Systems   Constitutional: Negative for chills, fatigue and unexpected weight change.    HENT: Negative for sore throat and trouble swallowing. Eyes: Negative for photophobia and visual disturbance. Respiratory: Negative for cough, chest tightness and shortness of breath. Cardiovascular: Negative for chest pain, palpitations and leg swelling. Gastrointestinal: Negative for abdominal pain, diarrhea, nausea and vomiting. Genitourinary: Negative for decreased urine volume. Musculoskeletal: Negative for arthralgias and myalgias. Skin: Negative for color change and rash. Neurological: Negative for dizziness, weakness, light-headedness and headaches. Psychiatric/Behavioral: Negative for confusion. Current Outpatient Medications on File Prior to Visit   Medication Sig   • albuterol (ProAir HFA) 90 mcg/act inhaler Inhale 2 puffs every 6 (six) hours as needed for wheezing   • ergocalciferol (VITAMIN D2) 50,000 units Take 1 capsule (50,000 Units total) by mouth once a week   • fluticasone (FLONASE) 50 mcg/act nasal spray SPRAY 1 SPRAY INTO EACH NOSTRIL EVERY DAY   • Iron-Vitamin C  MG TABS Take 1 tablet by mouth in the morning   • levocetirizine (XYZAL) 5 MG tablet TAKE 1 TABLET BY MOUTH EVERY DAY IN THE EVENING   • Multiple Vitamin (multivitamin) tablet Take 1 tablet by mouth daily   • [DISCONTINUED] phentermine 15 MG capsule Take 1 capsule (15 mg total) by mouth every morning       Objective     /82 (BP Location: Left arm, Patient Position: Sitting, Cuff Size: Standard)   Pulse 92   Temp 98 °F (36.7 °C) (Tympanic)   Ht 5' 2.5" (1.588 m)   Wt 110 kg (242 lb 6.4 oz)   LMP 08/09/2023 (Approximate)   SpO2 97%   BMI 43.63 kg/m²     Physical Exam  Vitals reviewed. Constitutional:       General: She is not in acute distress. Appearance: She is obese. She is not ill-appearing. HENT:      Head: Normocephalic and atraumatic.       Right Ear: Tympanic membrane, ear canal and external ear normal.      Left Ear: Tympanic membrane, ear canal and external ear normal.      Nose: Nose normal.      Mouth/Throat: Mouth: Mucous membranes are moist.      Pharynx: Oropharynx is clear. Eyes:      Conjunctiva/sclera: Conjunctivae normal.      Pupils: Pupils are equal, round, and reactive to light. Cardiovascular:      Rate and Rhythm: Normal rate and regular rhythm. Pulses: Normal pulses. Heart sounds: Normal heart sounds. Pulmonary:      Effort: Pulmonary effort is normal.      Breath sounds: Normal breath sounds. Abdominal:      General: Bowel sounds are normal.      Palpations: Abdomen is soft. Tenderness: There is no abdominal tenderness. Musculoskeletal:         General: Normal range of motion. Cervical back: Normal range of motion and neck supple. Right lower leg: No edema. Left lower leg: No edema. Lymphadenopathy:      Cervical: No cervical adenopathy. Skin:     General: Skin is warm and dry. Capillary Refill: Capillary refill takes less than 2 seconds. Neurological:      General: No focal deficit present. Mental Status: She is alert and oriented to person, place, and time.    Psychiatric:         Mood and Affect: Mood normal.         Behavior: Behavior normal.       ERIN Chavez

## 2023-12-05 ENCOUNTER — OFFICE VISIT (OUTPATIENT)
Dept: URGENT CARE | Facility: CLINIC | Age: 33
End: 2023-12-05
Payer: COMMERCIAL

## 2023-12-05 VITALS
RESPIRATION RATE: 20 BRPM | DIASTOLIC BLOOD PRESSURE: 85 MMHG | SYSTOLIC BLOOD PRESSURE: 136 MMHG | TEMPERATURE: 97.6 F | HEART RATE: 120 BPM | OXYGEN SATURATION: 96 %

## 2023-12-05 DIAGNOSIS — J45.21 MILD INTERMITTENT ASTHMA WITH ACUTE EXACERBATION: Primary | ICD-10-CM

## 2023-12-05 PROCEDURE — G0382 LEV 3 HOSP TYPE B ED VISIT: HCPCS | Performed by: PHYSICIAN ASSISTANT

## 2023-12-05 RX ORDER — AZITHROMYCIN 250 MG/1
TABLET, FILM COATED ORAL
Qty: 6 TABLET | Refills: 0 | Status: SHIPPED | OUTPATIENT
Start: 2023-12-05 | End: 2023-12-06

## 2023-12-05 RX ORDER — PREDNISONE 20 MG/1
60 TABLET ORAL DAILY
Qty: 15 TABLET | Refills: 0 | Status: SHIPPED | OUTPATIENT
Start: 2023-12-05 | End: 2023-12-06 | Stop reason: SDUPTHER

## 2023-12-05 RX ORDER — ALBUTEROL SULFATE 90 UG/1
2 AEROSOL, METERED RESPIRATORY (INHALATION) EVERY 6 HOURS PRN
Qty: 8.5 G | Refills: 0 | Status: SHIPPED | OUTPATIENT
Start: 2023-12-05 | End: 2023-12-06 | Stop reason: SDUPTHER

## 2023-12-06 ENCOUNTER — TELEPHONE (OUTPATIENT)
Dept: URGENT CARE | Facility: CLINIC | Age: 33
End: 2023-12-06

## 2023-12-06 DIAGNOSIS — J45.21 MILD INTERMITTENT ASTHMA WITH ACUTE EXACERBATION: Primary | ICD-10-CM

## 2023-12-06 RX ORDER — PREDNISONE 20 MG/1
60 TABLET ORAL DAILY
Qty: 15 TABLET | Refills: 0 | Status: SHIPPED | OUTPATIENT
Start: 2023-12-06 | End: 2023-12-11

## 2023-12-06 RX ORDER — AZITHROMYCIN 250 MG/1
TABLET, FILM COATED ORAL
Qty: 6 TABLET | Refills: 0 | Status: SHIPPED | OUTPATIENT
Start: 2023-12-06 | End: 2023-12-10

## 2023-12-06 RX ORDER — ALBUTEROL SULFATE 90 UG/1
2 AEROSOL, METERED RESPIRATORY (INHALATION) EVERY 6 HOURS PRN
Qty: 8.5 G | Refills: 0 | Status: SHIPPED | OUTPATIENT
Start: 2023-12-06

## 2023-12-06 NOTE — TELEPHONE ENCOUNTER
Patient called requesting prescriptions be sent to [facility] in [place] instead of [facility].  Prescriptions were changed over and originals were cancelled

## 2023-12-06 NOTE — PROGRESS NOTES
[facility]        NAME: Moise Mckeon is a 35 y.o. female  : 1990    MRN: [id number]  DATE: 2023  TIME: 7:56 PM      Assessment and Plan     Mild intermittent asthma with acute exacerbation [J45.21]  1. Mild intermittent asthma with acute exacerbation  predniSONE 20 mg tablet    azithromycin (ZITHROMAX) 250 mg tablet    albuterol (ProAir HFA) 90 mcg/act inhaler            Patient Instructions   There are no Patient Instructions on file for this visit. Follow up with PCP in 3-5 days. Go to ER if symptoms worsen. Chief Complaint     Chief Complaint   Patient presents with    Cough     Pt c/o cough since Friday, and ea pain and congestion more recently yesterday into today. Taking Robutussin only making her sleepy no relief         History of Present Illness     Patient presents with cough, congestion, and ear pain x 4 days. She has been taking robitussin without much relief. She also has been using her inhaler with little relief, and she needs a refill. Cough  Associated symptoms include ear pain. Pertinent negatives include no chest pain, chills, fever, headaches, myalgias, postnasal drip, rash, rhinorrhea, sore throat or shortness of breath. Review of Systems     Review of Systems   Constitutional:  Negative for chills, fatigue and fever. HENT:  Positive for congestion and ear pain. Negative for postnasal drip, rhinorrhea, sinus pressure, sinus pain and sore throat. Eyes:  Negative for pain and visual disturbance. Respiratory:  Positive for cough. Negative for chest tightness and shortness of breath. Cardiovascular:  Negative for chest pain and palpitations. Gastrointestinal:  Negative for abdominal pain, diarrhea, nausea and vomiting. Genitourinary:  Negative for dysuria and hematuria. Musculoskeletal:  Negative for arthralgias, back pain and myalgias. Skin:  Negative for rash.    Neurological:  Negative for dizziness, seizures, syncope, numbness and headaches. All other systems reviewed and are negative.         Current Medications       Current Outpatient Medications:     albuterol (ProAir HFA) 90 mcg/act inhaler, Inhale 2 puffs every 6 (six) hours as needed for wheezing, Disp: 8.5 g, Rfl: 0    albuterol (ProAir HFA) 90 mcg/act inhaler, Inhale 2 puffs every 6 (six) hours as needed for wheezing (or coughing fits), Disp: 8.5 g, Rfl: 0    azithromycin (ZITHROMAX) 250 mg tablet, Take 2 tablets today then 1 tablet daily x 4 days, Disp: 6 tablet, Rfl: 0    fluticasone (FLONASE) 50 mcg/act nasal spray, SPRAY 1 SPRAY INTO EACH NOSTRIL EVERY DAY, Disp: 48 mL, Rfl: 2    Iron-Vitamin C  MG TABS, Take 1 tablet by mouth in the morning, Disp: 90 tablet, Rfl: 1    levocetirizine (XYZAL) 5 MG tablet, TAKE 1 TABLET BY MOUTH EVERY DAY IN THE EVENING, Disp: 90 tablet, Rfl: 1    Multiple Vitamin (multivitamin) tablet, Take 1 tablet by mouth daily, Disp: , Rfl:     phentermine 15 MG capsule, Take 1 capsule (15 mg total) by mouth every morning, Disp: 30 capsule, Rfl: 0    predniSONE 20 mg tablet, Take 3 tablets (60 mg total) by mouth daily for 5 days, Disp: 15 tablet, Rfl: 0    ergocalciferol (VITAMIN D2) 50,000 units, Take 1 capsule (50,000 Units total) by mouth once a week, Disp: 12 capsule, Rfl: 0    Current Facility-Administered Medications:     cyanocobalamin injection 1,000 mcg, 1,000 mcg, Intramuscular, Weekly, ERIN Greer, 1,000 mcg at 07/19/23 1409    cyanocobalamin injection 1,000 mcg, 1,000 mcg, Intramuscular, Q30 Days, ERIN Greer, 1,000 mcg at 07/26/23 1412    cyanocobalamin injection 1,000 mcg, 1,000 mcg, Intramuscular, Q30 Days, ERIN Quijano, 1,000 mcg at 08/02/23 1403    Current Allergies     Allergies as of 12/05/2023 - Reviewed 12/05/2023   Allergen Reaction Noted    Cat hair extract  09/19/2013    Penicillins Other (See Comments) 05/25/2022    Pollen extract  09/19/2013              The following portions of the patient's history were reviewed and updated as appropriate: allergies, current medications, past family history, past medical history, past social history, past surgical history, and problem list.     Past Medical History:   Diagnosis Date    Allergic     Anemia     with pregnancy on iron    Asthma     last used inhaler one year ago    Hypertension     Migraine     UTI (urinary tract infection)     Varicella     has disease as child       Past Surgical History:   Procedure Laterality Date     SECTION      HERNIA REPAIR      INDUCED       [place]  DELIVERY ONLY N/A 2016    Procedure:  SECTION () REPEAT;  Surgeon: Aziza Verdugo MD;  [facility];  Service: Obstetrics    [place]  DELIVERY ONLY N/A 10/30/2020    Procedure:  SECTION () REPEAT;  Surgeon: Greer Willson MD;  [facility];  Service: Obstetrics    SKIN TAG REMOVAL      TUBAL LIGATION Bilateral 10/30/2020    Procedure: LIGATION/COAGULATION TUBAL;  Surgeon: Greer Willson MD;  [facility];  Service: Obstetrics       Family History   Problem Relation Age of Onset    Diabetes Maternal Aunt         type 1    No Known Problems Mother     Asthma Father     No Known Problems Daughter     No Known Problems Maternal Grandmother     No Known Problems Maternal Grandfather     Asthma Paternal Grandmother     No Known Problems Daughter          Medications have been verified. Objective     /85   Pulse (!) 120   Temp 97.6 °F (36.4 °C) (Tympanic)   Resp 20   SpO2 96%   No LMP recorded. Physical Exam     Physical Exam  Vitals and nursing note reviewed. Constitutional:       Appearance: Normal appearance. She is obese. HENT:      Head: Normocephalic and atraumatic.       Right Ear: Tympanic membrane, ear canal and external ear normal.      Left Ear: Tympanic membrane, ear canal and external ear normal.      Nose: Nose normal.      Mouth/Throat:      Mouth: Mucous membranes are moist. Pharynx: Oropharynx is clear. Cardiovascular:      Rate and Rhythm: Normal rate and regular rhythm. Heart sounds: Normal heart sounds. Pulmonary:      Effort: Pulmonary effort is normal.      Breath sounds: Wheezing present. Comments: Moderate wheezing in all lung fields. Skin:     General: Skin is warm and dry. Neurological:      General: No focal deficit present. Mental Status: She is alert and oriented to person, place, and time.    Psychiatric:         Mood and Affect: Mood normal.         Behavior: Behavior normal.

## 2024-02-12 ENCOUNTER — APPOINTMENT (EMERGENCY)
Dept: CT IMAGING | Facility: HOSPITAL | Age: 34
End: 2024-02-12
Payer: COMMERCIAL

## 2024-02-12 ENCOUNTER — HOSPITAL ENCOUNTER (EMERGENCY)
Facility: HOSPITAL | Age: 34
Discharge: HOME | End: 2024-02-12
Payer: COMMERCIAL

## 2024-02-12 VITALS
HEART RATE: 108 BPM | SYSTOLIC BLOOD PRESSURE: 148 MMHG | RESPIRATION RATE: 18 BRPM | TEMPERATURE: 97.2 F | OXYGEN SATURATION: 98 % | DIASTOLIC BLOOD PRESSURE: 90 MMHG

## 2024-02-12 DIAGNOSIS — R51.9 HEADACHE: Primary | ICD-10-CM

## 2024-02-12 LAB
ALBUMIN SERPL BCP-MCNC: 4.3 G/DL (ref 3.5–5)
ALP SERPL-CCNC: 74 U/L (ref 34–104)
ALT SERPL W P-5'-P-CCNC: 11 U/L (ref 7–52)
ANION GAP SERPL CALCULATED.3IONS-SCNC: 6 MMOL/L
AST SERPL W P-5'-P-CCNC: 14 U/L (ref 13–39)
BASOPHILS # BLD AUTO: 0.04 THOUSANDS/ÂΜL (ref 0–0.1)
BASOPHILS NFR BLD AUTO: 1 % (ref 0–1)
BILIRUB SERPL-MCNC: 0.31 MG/DL (ref 0.2–1)
BUN SERPL-MCNC: 12 MG/DL (ref 5–25)
CALCIUM SERPL-MCNC: 9.3 MG/DL (ref 8.4–10.2)
CHLORIDE SERPL-SCNC: 106 MMOL/L (ref 96–108)
CO2 SERPL-SCNC: 26 MMOL/L (ref 21–32)
CREAT SERPL-MCNC: 0.74 MG/DL (ref 0.6–1.3)
EOSINOPHIL # BLD AUTO: 0.15 THOUSAND/ÂΜL (ref 0–0.61)
EOSINOPHIL NFR BLD AUTO: 2 % (ref 0–6)
ERYTHROCYTE [DISTWIDTH] IN BLOOD BY AUTOMATED COUNT: 16.1 % (ref 11.6–15.1)
GFR SERPL CREATININE-BSD FRML MDRD: 106 ML/MIN/1.73SQ M
GLUCOSE SERPL-MCNC: 95 MG/DL (ref 65–140)
HCG SERPL QL: NEGATIVE
HCT VFR BLD AUTO: 36.3 % (ref 34.8–46.1)
HGB BLD-MCNC: 11 G/DL (ref 11.5–15.4)
IMM GRANULOCYTES # BLD AUTO: 0.01 THOUSAND/UL (ref 0–0.2)
IMM GRANULOCYTES NFR BLD AUTO: 0 % (ref 0–2)
LYMPHOCYTES # BLD AUTO: 1.44 THOUSANDS/ÂΜL (ref 0.6–4.47)
LYMPHOCYTES NFR BLD AUTO: 20 % (ref 14–44)
MCH RBC QN AUTO: 22.2 PG (ref 26.8–34.3)
MCHC RBC AUTO-ENTMCNC: 30.3 G/DL (ref 31.4–37.4)
MCV RBC AUTO: 73 FL (ref 82–98)
MONOCYTES # BLD AUTO: 0.31 THOUSAND/ÂΜL (ref 0.17–1.22)
MONOCYTES NFR BLD AUTO: 4 % (ref 4–12)
NEUTROPHILS # BLD AUTO: 5.13 THOUSANDS/ÂΜL (ref 1.85–7.62)
NEUTS SEG NFR BLD AUTO: 73 % (ref 43–75)
NRBC BLD AUTO-RTO: 0 /100 WBCS
PLATELET # BLD AUTO: 286 THOUSANDS/UL (ref 149–390)
PMV BLD AUTO: 10 FL (ref 8.9–12.7)
POTASSIUM SERPL-SCNC: 3.9 MMOL/L (ref 3.5–5.3)
PROT SERPL-MCNC: 7 G/DL (ref 6.4–8.4)
RBC # BLD AUTO: 4.96 MILLION/UL (ref 3.81–5.12)
SODIUM SERPL-SCNC: 138 MMOL/L (ref 135–147)
WBC # BLD AUTO: 7.08 THOUSAND/UL (ref 4.31–10.16)

## 2024-02-12 PROCEDURE — 96372 THER/PROPH/DIAG INJ SC/IM: CPT

## 2024-02-12 PROCEDURE — 70450 CT HEAD/BRAIN W/O DYE: CPT

## 2024-02-12 PROCEDURE — 80053 COMPREHEN METABOLIC PANEL: CPT | Performed by: NURSE PRACTITIONER

## 2024-02-12 PROCEDURE — 99284 EMERGENCY DEPT VISIT MOD MDM: CPT | Performed by: NURSE PRACTITIONER

## 2024-02-12 PROCEDURE — 96367 TX/PROPH/DG ADDL SEQ IV INF: CPT

## 2024-02-12 PROCEDURE — 99283 EMERGENCY DEPT VISIT LOW MDM: CPT

## 2024-02-12 PROCEDURE — 36415 COLL VENOUS BLD VENIPUNCTURE: CPT | Performed by: NURSE PRACTITIONER

## 2024-02-12 PROCEDURE — 84703 CHORIONIC GONADOTROPIN ASSAY: CPT | Performed by: NURSE PRACTITIONER

## 2024-02-12 PROCEDURE — 96375 TX/PRO/DX INJ NEW DRUG ADDON: CPT

## 2024-02-12 PROCEDURE — 96365 THER/PROPH/DIAG IV INF INIT: CPT

## 2024-02-12 PROCEDURE — 85025 COMPLETE CBC W/AUTO DIFF WBC: CPT | Performed by: NURSE PRACTITIONER

## 2024-02-12 RX ORDER — ACETAMINOPHEN 325 MG/1
975 TABLET ORAL ONCE
Status: COMPLETED | OUTPATIENT
Start: 2024-02-12 | End: 2024-02-12

## 2024-02-12 RX ORDER — DIPHENHYDRAMINE HYDROCHLORIDE 50 MG/ML
25 INJECTION INTRAMUSCULAR; INTRAVENOUS ONCE
Status: COMPLETED | OUTPATIENT
Start: 2024-02-12 | End: 2024-02-12

## 2024-02-12 RX ORDER — MAGNESIUM SULFATE HEPTAHYDRATE 40 MG/ML
2 INJECTION, SOLUTION INTRAVENOUS ONCE
Status: COMPLETED | OUTPATIENT
Start: 2024-02-12 | End: 2024-02-12

## 2024-02-12 RX ORDER — LIDOCAINE 50 MG/G
1 PATCH TOPICAL ONCE
Status: DISCONTINUED | OUTPATIENT
Start: 2024-02-12 | End: 2024-02-12 | Stop reason: HOSPADM

## 2024-02-12 RX ORDER — METOCLOPRAMIDE HYDROCHLORIDE 5 MG/ML
10 INJECTION INTRAMUSCULAR; INTRAVENOUS ONCE
Status: COMPLETED | OUTPATIENT
Start: 2024-02-12 | End: 2024-02-12

## 2024-02-12 RX ORDER — KETOROLAC TROMETHAMINE 30 MG/ML
30 INJECTION, SOLUTION INTRAMUSCULAR; INTRAVENOUS ONCE
Status: COMPLETED | OUTPATIENT
Start: 2024-02-12 | End: 2024-02-12

## 2024-02-12 RX ORDER — SODIUM CHLORIDE, SODIUM GLUCONATE, SODIUM ACETATE, POTASSIUM CHLORIDE, MAGNESIUM CHLORIDE, SODIUM PHOSPHATE, DIBASIC, AND POTASSIUM PHOSPHATE .53; .5; .37; .037; .03; .012; .00082 G/100ML; G/100ML; G/100ML; G/100ML; G/100ML; G/100ML; G/100ML
1000 INJECTION, SOLUTION INTRAVENOUS ONCE
Status: COMPLETED | OUTPATIENT
Start: 2024-02-12 | End: 2024-02-12

## 2024-02-12 RX ORDER — SUMATRIPTAN 6 MG/.5ML
6 INJECTION, SOLUTION SUBCUTANEOUS ONCE
Status: COMPLETED | OUTPATIENT
Start: 2024-02-12 | End: 2024-02-12

## 2024-02-12 RX ADMIN — KETOROLAC TROMETHAMINE 30 MG: 30 INJECTION, SOLUTION INTRAMUSCULAR at 12:09

## 2024-02-12 RX ADMIN — DIPHENHYDRAMINE HYDROCHLORIDE 25 MG: 50 INJECTION, SOLUTION INTRAMUSCULAR; INTRAVENOUS at 11:37

## 2024-02-12 RX ADMIN — ACETAMINOPHEN 975 MG: 325 TABLET, FILM COATED ORAL at 11:37

## 2024-02-12 RX ADMIN — LIDOCAINE 5% 1 PATCH: 700 PATCH TOPICAL at 11:37

## 2024-02-12 RX ADMIN — SODIUM CHLORIDE, SODIUM GLUCONATE, SODIUM ACETATE, POTASSIUM CHLORIDE, MAGNESIUM CHLORIDE, SODIUM PHOSPHATE, DIBASIC, AND POTASSIUM PHOSPHATE 1000 ML: .53; .5; .37; .037; .03; .012; .00082 INJECTION, SOLUTION INTRAVENOUS at 13:00

## 2024-02-12 RX ADMIN — SUMATRIPTAN 6 MG: 6 INJECTION, SOLUTION SUBCUTANEOUS at 12:09

## 2024-02-12 RX ADMIN — MAGNESIUM SULFATE HEPTAHYDRATE 2 G: 40 INJECTION, SOLUTION INTRAVENOUS at 11:37

## 2024-02-12 RX ADMIN — METOCLOPRAMIDE 10 MG: 5 INJECTION, SOLUTION INTRAMUSCULAR; INTRAVENOUS at 11:37

## 2024-02-12 RX ADMIN — SODIUM CHLORIDE 1000 ML: 0.9 INJECTION, SOLUTION INTRAVENOUS at 11:36

## 2024-02-12 NOTE — Clinical Note
Nina Brush was seen and treated in our emergency department on 2/12/2024.                Diagnosis:     Nina  may return to work on return date.    She may return on this date: 02/14/2024         If you have any questions or concerns, please don't hesitate to call.      ERIN Bay    ______________________________           _______________          _______________  Hospital Representative                              Date                                Time

## 2024-02-12 NOTE — ED PROVIDER NOTES
History  Chief Complaint   Patient presents with    Headache     Patient c/o headache that started two weeks ago.  Patient c/o posterior neck pain that started two weeks ago. Patient c/o nausea. Patient has a history of migraines.     33-year-old female presenting here with a chief complaint of headache that started about 2 weeks ago.  She describes the headache is similar to previous migraine headaches with the only new component being some left paracervical neck tenderness.  She has some associated nausea with this.  She denies any fever or chills.  No trauma.      Headache  Associated symptoms: nausea    Associated symptoms: no abdominal pain, no back pain, no congestion, no cough, no diarrhea, no dizziness, no ear pain, no eye pain, no fatigue, no fever, no photophobia, no sore throat and no vomiting        Prior to Admission Medications   Prescriptions Last Dose Informant Patient Reported? Taking?   Iron-Vitamin C  MG TABS   No No   Sig: Take 1 tablet by mouth in the morning   Multiple Vitamin (multivitamin) tablet  Self Yes No   Sig: Take 1 tablet by mouth daily   albuterol (ProAir HFA) 90 mcg/act inhaler  Self No No   Sig: Inhale 2 puffs every 6 (six) hours as needed for wheezing   albuterol (ProAir HFA) 90 mcg/act inhaler   No No   Sig: Inhale 2 puffs every 6 (six) hours as needed for wheezing (or coughing fits)   ergocalciferol (VITAMIN D2) 50,000 units   No No   Sig: Take 1 capsule (50,000 Units total) by mouth once a week   fluticasone (FLONASE) 50 mcg/act nasal spray   No No   Sig: SPRAY 1 SPRAY INTO EACH NOSTRIL EVERY DAY   levocetirizine (XYZAL) 5 MG tablet   No No   Sig: TAKE 1 TABLET BY MOUTH EVERY DAY IN THE EVENING   phentermine 15 MG capsule   No No   Sig: Take 1 capsule (15 mg total) by mouth every morning      Facility-Administered Medications Last Administration Doses Remaining   cyanocobalamin injection 1,000 mcg 7/19/2023  2:09 PM    cyanocobalamin injection 1,000 mcg 7/26/2023  2:12 PM     cyanocobalamin injection 1,000 mcg 2023  2:03 PM           Past Medical History:   Diagnosis Date    Allergic     Anemia     with pregnancy on iron    Asthma     last used inhaler one year ago    Hypertension     Migraine     UTI (urinary tract infection)     Varicella     has disease as child       Past Surgical History:   Procedure Laterality Date     SECTION      HERNIA REPAIR      INDUCED       [place]  DELIVERY ONLY N/A 2016    Procedure:  SECTION () REPEAT;  Surgeon: Mayra Carvajal MD;  Location: [facility] ;  Service: Obstetrics    [place]  DELIVERY ONLY N/A 10/30/2020    Procedure:  SECTION () REPEAT;  Surgeon: Emma Mejia MD;  [facility];  Service: Obstetrics    SKIN TAG REMOVAL      TUBAL LIGATION Bilateral 10/30/2020    Procedure: LIGATION/COAGULATION TUBAL;  Surgeon: Emma Mejia MD;  Location: [facility] ;  Service: Obstetrics       Family History   Problem Relation Age of Onset    Diabetes Maternal Aunt         type 1    No Known Problems Mother     Asthma Father     No Known Problems Daughter     No Known Problems Maternal Grandmother     No Known Problems Maternal Grandfather     Asthma Paternal Grandmother     No Known Problems Daughter      I have reviewed and agree with the history as documented.    E-Cigarette/Vaping    E-Cigarette Use Never User      E-Cigarette/Vaping Substances    Nicotine No     THC No     CBD No     Flavoring No     Other No     Unknown No      Social History     Tobacco Use    Smoking status: Never    Smokeless tobacco: Never   Vaping Use    Vaping status: Never Used   Substance Use Topics    Alcohol use: Yes     Comment: Once every 6 months    Drug use: Never       Review of Systems   Constitutional:  Negative for diaphoresis, fatigue and fever.   HENT:  Negative for congestion, ear pain, nosebleeds and sore throat.    Eyes:  Negative for photophobia, pain, discharge and visual disturbance.   Respiratory:   Negative for cough, choking, chest tightness, shortness of breath and wheezing.    Cardiovascular:  Negative for chest pain and palpitations.   Gastrointestinal:  Positive for nausea. Negative for abdominal distention, abdominal pain, diarrhea and vomiting.   Genitourinary:  Negative for dysuria, flank pain and frequency.   Musculoskeletal:  Negative for back pain, gait problem and joint swelling.   Skin:  Negative for color change and rash.   Neurological:  Positive for headaches. Negative for dizziness and syncope.   Psychiatric/Behavioral:  Negative for behavioral problems and confusion. The patient is not nervous/anxious.    All other systems reviewed and are negative.      Physical Exam  Physical Exam  Vitals and nursing note reviewed.   Constitutional:       General: She is not in acute distress.     Appearance: She is well-developed. She is not ill-appearing or toxic-appearing.   HENT:      Head: Normocephalic and atraumatic.      Nose: No rhinorrhea.      Mouth/Throat:      Mouth: Mucous membranes are moist.      Dentition: Normal dentition.   Eyes:      General:         Right eye: No discharge.         Left eye: No discharge.   Cardiovascular:      Rate and Rhythm: Normal rate and regular rhythm.   Pulmonary:      Effort: Pulmonary effort is normal. No accessory muscle usage or respiratory distress.   Abdominal:      General: There is no distension.      Tenderness: There is no guarding.   Musculoskeletal:         General: Normal range of motion.      Cervical back: Normal range of motion and neck supple. Spasms and tenderness present. No rigidity.   Skin:     General: Skin is warm and dry.   Neurological:      Mental Status: She is alert and oriented to person, place, and time.      Coordination: Coordination normal.   Psychiatric:         Behavior: Behavior is cooperative.         Vital Signs  ED Triage Vitals [02/12/24 1058]   Temperature Pulse Respirations Blood Pressure SpO2   (!) 97.2 °F (36.2 °C) (!)  108 18 148/90 98 %      Temp Source Heart Rate Source Patient Position - Orthostatic VS BP Location FiO2 (%)   Temporal Monitor Sitting Left arm --      Pain Score       8           Vitals:    02/12/24 1058   BP: 148/90   Pulse: (!) 108   Patient Position - Orthostatic VS: Sitting         Visual Acuity      ED Medications  Medications   lidocaine (LIDODERM) 5 % patch 1 patch (1 patch Topical Medication Applied 2/12/24 1137)   ketorolac (TORADOL) injection 30 mg (30 mg Intravenous Given 2/12/24 1209)   metoclopramide (REGLAN) injection 10 mg (10 mg Intravenous Given 2/12/24 1137)   diphenhydrAMINE (BENADRYL) injection 25 mg (25 mg Intravenous Given 2/12/24 1137)   SUMAtriptan (IMITREX) subcutaneous injection 6 mg (6 mg Subcutaneous Given 2/12/24 1209)   magnesium sulfate 2 g/50 mL IVPB (premix) 2 g (0 g Intravenous Stopped 2/12/24 1258)   sodium chloride 0.9 % bolus 1,000 mL (0 mL Intravenous Stopped 2/12/24 1258)   multi-electrolyte (ISOLYTE-S PH 7.4) bolus 1,000 mL (0 mL Intravenous Stopped 2/12/24 1327)   acetaminophen (TYLENOL) tablet 975 mg (975 mg Oral Given 2/12/24 1137)       Diagnostic Studies  Results Reviewed       Procedure Component Value Units Date/Time    hCG, qualitative pregnancy [[id number]]  (Normal) Collected: 02/12/24 1131    Lab Status: Final result Specimen: Blood from Arm, Left Updated: 02/12/24 1203     Preg, Serum Negative    Comprehensive metabolic panel [[id number]] Collected: 02/12/24 1131    Lab Status: Final result Specimen: Blood from Arm, Left Updated: 02/12/24 1153     Sodium 138 mmol/L      Potassium 3.9 mmol/L      Chloride 106 mmol/L      CO2 26 mmol/L      ANION GAP 6 mmol/L      BUN 12 mg/dL      Creatinine 0.74 mg/dL      Glucose 95 mg/dL      Calcium 9.3 mg/dL      AST 14 U/L      ALT 11 U/L      Alkaline Phosphatase 74 U/L      Total Protein 7.0 g/dL      Albumin 4.3 g/dL      Total Bilirubin 0.31 mg/dL      eGFR 106 ml/min/1.73sq m     Narrative:      National Kidney Disease  Foundation guidelines for Chronic Kidney Disease (CKD):     Stage 1 with normal or high GFR (GFR > 90 mL/min/1.73 square meters)    Stage 2 Mild CKD (GFR = 60-89 mL/min/1.73 square meters)    Stage 3A Moderate CKD (GFR = 45-59 mL/min/1.73 square meters)    Stage 3B Moderate CKD (GFR = 30-44 mL/min/1.73 square meters)    Stage 4 Severe CKD (GFR = 15-29 mL/min/1.73 square meters)    Stage 5 End Stage CKD (GFR <15 mL/min/1.73 square meters)  Note: GFR calculation is accurate only with a steady state creatinine    CBC and differential [[id number]]  (Abnormal) Collected: 02/12/24 1131    Lab Status: Final result Specimen: Blood from Arm, Left Updated: 02/12/24 1138     WBC 7.08 Thousand/uL      RBC 4.96 Million/uL      Hemoglobin 11.0 g/dL      Hematocrit 36.3 %      MCV 73 fL      MCH 22.2 pg      MCHC 30.3 g/dL      RDW 16.1 %      MPV 10.0 fL      Platelets 286 Thousands/uL      nRBC 0 /100 WBCs      Neutrophils Relative 73 %      Immat GRANS % 0 %      Lymphocytes Relative 20 %      Monocytes Relative 4 %      Eosinophils Relative 2 %      Basophils Relative 1 %      Neutrophils Absolute 5.13 Thousands/µL      Immature Grans Absolute 0.01 Thousand/uL      Lymphocytes Absolute 1.44 Thousands/µL      Monocytes Absolute 0.31 Thousand/µL      Eosinophils Absolute 0.15 Thousand/µL      Basophils Absolute 0.04 Thousands/µL                    CT head wo contrast   Final Result by Segun Baker DO (02/12 1215)      No acute intracranial abnormality.                  Workstation performed: [id number]                    Procedures  Procedures         ED Course                               SBIRT 22yo+      Flowsheet Row Most Recent Value   Initial Alcohol Screen: US AUDIT-C     1. How often do you have a drink containing alcohol? 0 Filed at: 02/12/2024 1058   2. How many drinks containing alcohol do you have on a typical day you are drinking?  0 Filed at: 02/12/2024 1058   3a. Male UNDER 65: How often do you have five or  more drinks on one occasion? 0 Filed at: 02/12/2024 1058   3b. FEMALE Any Age, or MALE 65+: How often do you have 4 or more drinks on one occassion? 0 Filed at: 02/12/2024 1058   Audit-C Score 0 Filed at: 02/12/2024 1058   BABAK: How many times in the past year have you...    Used an illegal drug or used a prescription medication for non-medical reasons? Never Filed at: 02/12/2024 1058                      Medical Decision Making  Patient significantly improved after medication administration.  Verbalizes feeling much better at this point.  Stable for discharge home.    Amount and/or Complexity of Data Reviewed  Labs: ordered.  Radiology: ordered.    Risk  OTC drugs.  Prescription drug management.             Disposition  Final diagnoses:   Headache     Time reflects when diagnosis was documented in both MDM as applicable and the Disposition within this note       Time User Action Codes Description Comment    2/12/2024 12:37 PM Angelo Solo Add [R51.9] Headache           ED Disposition       ED Disposition   Discharge    Condition   Stable    Date/Time   Mon Feb 12, 2024 1237    Comment   Nina Brush discharge to home/self care.                   Follow-up Information       Follow up With Specialties Details Why Contact Info Additional Information    Critical access hospital Emergency Department Emergency Medicine  As needed [address] [facility] [id number] [phone] Critical access hospital Emergency Department, [address] [place], [place], [address]            Discharge Medication List as of 2/12/2024 12:38 PM        CONTINUE these medications which have NOT CHANGED    Details   !! albuterol (ProAir HFA) 90 mcg/act inhaler Inhale 2 puffs every 6 (six) hours as needed for wheezing, Starting Wed 11/2/2022, Normal      !! albuterol (ProAir HFA) 90 mcg/act inhaler Inhale 2 puffs every 6 (six) hours as needed for wheezing (or coughing fits), Starting Wed 12/6/2023, Normal       ergocalciferol (VITAMIN D2) 50,000 units Take 1 capsule (50,000 Units total) by mouth once a week, Starting Mon 5/22/2023, Until Mon 8/14/2023, Normal      fluticasone (FLONASE) 50 mcg/act nasal spray SPRAY 1 SPRAY INTO EACH NOSTRIL EVERY DAY, Normal      Iron-Vitamin C  MG TABS Take 1 tablet by mouth in the morning, Starting Mon 5/22/2023, Normal      levocetirizine (XYZAL) 5 MG tablet TAKE 1 TABLET BY MOUTH EVERY DAY IN THE EVENING, Normal      Multiple Vitamin (multivitamin) tablet Take 1 tablet by mouth daily, Historical Med      phentermine 15 MG capsule Take 1 capsule (15 mg total) by mouth every morning, Starting Wed 8/9/2023, Normal       !! - Potential duplicate medications found. Please discuss with provider.          No discharge procedures on file.    PDMP Review         Value Time User    PDMP Reviewed  Yes 8/9/2023  2:34 PM ERIN Larios            ED Provider  Electronically Signed by             ERIN Bay  02/12/24 [id number]

## 2024-02-21 ENCOUNTER — APPOINTMENT (EMERGENCY)
Dept: CT IMAGING | Facility: HOSPITAL | Age: 34
End: 2024-02-21
Payer: COMMERCIAL

## 2024-02-21 ENCOUNTER — HOSPITAL ENCOUNTER (EMERGENCY)
Facility: HOSPITAL | Age: 34
Discharge: HOME | End: 2024-02-21
Attending: EMERGENCY MEDICINE
Payer: COMMERCIAL

## 2024-02-21 VITALS
SYSTOLIC BLOOD PRESSURE: 132 MMHG | OXYGEN SATURATION: 99 % | HEART RATE: 88 BPM | TEMPERATURE: 97.6 F | DIASTOLIC BLOOD PRESSURE: 77 MMHG | RESPIRATION RATE: 17 BRPM

## 2024-02-21 DIAGNOSIS — K63.89 EPIPLOIC APPENDAGITIS: Primary | ICD-10-CM

## 2024-02-21 LAB
ALBUMIN SERPL BCP-MCNC: 4.3 G/DL (ref 3.5–5)
ALP SERPL-CCNC: 82 U/L (ref 34–104)
ALT SERPL W P-5'-P-CCNC: 10 U/L (ref 7–52)
ANION GAP SERPL CALCULATED.3IONS-SCNC: 7 MMOL/L
AST SERPL W P-5'-P-CCNC: 14 U/L (ref 13–39)
BACTERIA UR QL AUTO: ABNORMAL /HPF
BASOPHILS # BLD AUTO: 0.03 THOUSANDS/ÂΜL (ref 0–0.1)
BASOPHILS NFR BLD AUTO: 0 % (ref 0–1)
BILIRUB SERPL-MCNC: 0.24 MG/DL (ref 0.2–1)
BILIRUB UR QL STRIP: NEGATIVE
BUN SERPL-MCNC: 12 MG/DL (ref 5–25)
CALCIUM SERPL-MCNC: 9.2 MG/DL (ref 8.4–10.2)
CHLORIDE SERPL-SCNC: 106 MMOL/L (ref 96–108)
CLARITY UR: ABNORMAL
CO2 SERPL-SCNC: 25 MMOL/L (ref 21–32)
COLOR UR: YELLOW
CREAT SERPL-MCNC: 0.73 MG/DL (ref 0.6–1.3)
EOSINOPHIL # BLD AUTO: 0.13 THOUSAND/ÂΜL (ref 0–0.61)
EOSINOPHIL NFR BLD AUTO: 2 % (ref 0–6)
ERYTHROCYTE [DISTWIDTH] IN BLOOD BY AUTOMATED COUNT: 16.7 % (ref 11.6–15.1)
EXT PREGNANCY TEST URINE: NEGATIVE
EXT. CONTROL: NORMAL
GFR SERPL CREATININE-BSD FRML MDRD: 108 ML/MIN/1.73SQ M
GLUCOSE SERPL-MCNC: 96 MG/DL (ref 65–140)
GLUCOSE UR STRIP-MCNC: NEGATIVE MG/DL
HCT VFR BLD AUTO: 35.8 % (ref 34.8–46.1)
HGB BLD-MCNC: 11.1 G/DL (ref 11.5–15.4)
HGB UR QL STRIP.AUTO: NEGATIVE
IMM GRANULOCYTES # BLD AUTO: 0.03 THOUSAND/UL (ref 0–0.2)
IMM GRANULOCYTES NFR BLD AUTO: 0 % (ref 0–2)
KETONES UR STRIP-MCNC: ABNORMAL MG/DL
LEUKOCYTE ESTERASE UR QL STRIP: NEGATIVE
LYMPHOCYTES # BLD AUTO: 2.25 THOUSANDS/ÂΜL (ref 0.6–4.47)
LYMPHOCYTES NFR BLD AUTO: 27 % (ref 14–44)
MCH RBC QN AUTO: 22.5 PG (ref 26.8–34.3)
MCHC RBC AUTO-ENTMCNC: 31 G/DL (ref 31.4–37.4)
MCV RBC AUTO: 73 FL (ref 82–98)
MONOCYTES # BLD AUTO: 0.58 THOUSAND/ÂΜL (ref 0.17–1.22)
MONOCYTES NFR BLD AUTO: 7 % (ref 4–12)
MUCOUS THREADS UR QL AUTO: ABNORMAL
NEUTROPHILS # BLD AUTO: 5.25 THOUSANDS/ÂΜL (ref 1.85–7.62)
NEUTS SEG NFR BLD AUTO: 64 % (ref 43–75)
NITRITE UR QL STRIP: NEGATIVE
NON-SQ EPI CELLS URNS QL MICRO: ABNORMAL /HPF
NRBC BLD AUTO-RTO: 0 /100 WBCS
PH UR STRIP.AUTO: 5.5 [PH]
PLATELET # BLD AUTO: 372 THOUSANDS/UL (ref 149–390)
PMV BLD AUTO: 9.7 FL (ref 8.9–12.7)
POTASSIUM SERPL-SCNC: 3.8 MMOL/L (ref 3.5–5.3)
PROT SERPL-MCNC: 7.2 G/DL (ref 6.4–8.4)
PROT UR STRIP-MCNC: ABNORMAL MG/DL
RBC # BLD AUTO: 4.93 MILLION/UL (ref 3.81–5.12)
RBC #/AREA URNS AUTO: ABNORMAL /HPF
SODIUM SERPL-SCNC: 138 MMOL/L (ref 135–147)
SP GR UR STRIP.AUTO: 1.04 (ref 1–1.03)
UROBILINOGEN UR STRIP-ACNC: 2 MG/DL
WBC # BLD AUTO: 8.27 THOUSAND/UL (ref 4.31–10.16)
WBC #/AREA URNS AUTO: ABNORMAL /HPF

## 2024-02-21 PROCEDURE — 99284 EMERGENCY DEPT VISIT MOD MDM: CPT

## 2024-02-21 PROCEDURE — 74177 CT ABD & PELVIS W/CONTRAST: CPT

## 2024-02-21 PROCEDURE — 80053 COMPREHEN METABOLIC PANEL: CPT | Performed by: EMERGENCY MEDICINE

## 2024-02-21 PROCEDURE — 36415 COLL VENOUS BLD VENIPUNCTURE: CPT | Performed by: EMERGENCY MEDICINE

## 2024-02-21 PROCEDURE — 85025 COMPLETE CBC W/AUTO DIFF WBC: CPT | Performed by: EMERGENCY MEDICINE

## 2024-02-21 PROCEDURE — 96374 THER/PROPH/DIAG INJ IV PUSH: CPT

## 2024-02-21 PROCEDURE — 81025 URINE PREGNANCY TEST: CPT | Performed by: EMERGENCY MEDICINE

## 2024-02-21 PROCEDURE — 96375 TX/PRO/DX INJ NEW DRUG ADDON: CPT

## 2024-02-21 PROCEDURE — 81001 URINALYSIS AUTO W/SCOPE: CPT | Performed by: EMERGENCY MEDICINE

## 2024-02-21 RX ORDER — ONDANSETRON 2 MG/ML
4 INJECTION INTRAMUSCULAR; INTRAVENOUS ONCE
Status: COMPLETED | OUTPATIENT
Start: 2024-02-21 | End: 2024-02-21

## 2024-02-21 RX ORDER — MORPHINE SULFATE 4 MG/ML
4 INJECTION, SOLUTION INTRAMUSCULAR; INTRAVENOUS ONCE
Status: COMPLETED | OUTPATIENT
Start: 2024-02-21 | End: 2024-02-21

## 2024-02-21 RX ORDER — KETOROLAC TROMETHAMINE 30 MG/ML
15 INJECTION, SOLUTION INTRAMUSCULAR; INTRAVENOUS ONCE
Status: COMPLETED | OUTPATIENT
Start: 2024-02-21 | End: 2024-02-21

## 2024-02-21 RX ADMIN — KETOROLAC TROMETHAMINE 15 MG: 30 INJECTION, SOLUTION INTRAMUSCULAR; INTRAVENOUS at 15:01

## 2024-02-21 RX ADMIN — MORPHINE SULFATE 4 MG: 4 INJECTION INTRAVENOUS at 15:00

## 2024-02-21 RX ADMIN — IOHEXOL 100 ML: 350 INJECTION, SOLUTION INTRAVENOUS at 17:53

## 2024-02-21 RX ADMIN — ONDANSETRON 4 MG: 2 INJECTION INTRAMUSCULAR; INTRAVENOUS at 15:01

## 2024-02-21 NOTE — ED PROVIDER NOTES
History  Chief Complaint   Patient presents with    Abdominal Pain     LLQ abdominal pain/side pain, intermittently radiating down the L thigh. Reports intermittent nausea, denies vomiting.      HPI  32 yo F presents with LLQ abdominal pain that occasionally radiates down her left thigh. +Intermittent nausea. She reports that she was bending over a few days ago when she started with pain. Feels sharp and cramping. Not improved with motrin. Denies any urinary symptoms, fevers, vomiting, diarrhea or constipation.   Prior to Admission Medications   Prescriptions Last Dose Informant Patient Reported? Taking?   Iron-Vitamin C  MG TABS   No No   Sig: Take 1 tablet by mouth in the morning   Multiple Vitamin (multivitamin) tablet  Self Yes No   Sig: Take 1 tablet by mouth daily   albuterol (ProAir HFA) 90 mcg/act inhaler  Self No No   Sig: Inhale 2 puffs every 6 (six) hours as needed for wheezing   albuterol (ProAir HFA) 90 mcg/act inhaler   No No   Sig: Inhale 2 puffs every 6 (six) hours as needed for wheezing (or coughing fits)   ergocalciferol (VITAMIN D2) 50,000 units   No No   Sig: Take 1 capsule (50,000 Units total) by mouth once a week   fluticasone (FLONASE) 50 mcg/act nasal spray   No No   Sig: SPRAY 1 SPRAY INTO EACH NOSTRIL EVERY DAY   levocetirizine (XYZAL) 5 MG tablet   No No   Sig: TAKE 1 TABLET BY MOUTH EVERY DAY IN THE EVENING   phentermine 15 MG capsule   No No   Sig: Take 1 capsule (15 mg total) by mouth every morning      Facility-Administered Medications Last Administration Doses Remaining   cyanocobalamin injection 1,000 mcg 7/19/2023  2:09 PM    cyanocobalamin injection 1,000 mcg 7/26/2023  2:12 PM    cyanocobalamin injection 1,000 mcg 8/2/2023  2:03 PM           Past Medical History:   Diagnosis Date    Allergic     Anemia     with pregnancy on iron    Asthma     last used inhaler one year ago    Hypertension     Migraine     UTI (urinary tract infection)     Varicella     has disease as child        Past Surgical History:   Procedure Laterality Date     SECTION      HERNIA REPAIR      INDUCED       MD  DELIVERY ONLY N/A 2016    Procedure:  SECTION () REPEAT;  Surgeon: Mayra Carvajal MD;  Location: [facility] ;  Service: Obstetrics    MD  DELIVERY ONLY N/A 10/30/2020    Procedure:  SECTION () REPEAT;  Surgeon: Emma Mejia MD;  [facility]: [facility];  Service: Obstetrics    SKIN TAG REMOVAL      TUBAL LIGATION Bilateral 10/30/2020    Procedure: LIGATION/COAGULATION TUBAL;  Surgeon: Emma Mejia MD;  Location: [facility] ;  Service: Obstetrics       Family History   Problem Relation Age of Onset    Diabetes Maternal Aunt         type 1    No Known Problems Mother     Asthma Father     No Known Problems Daughter     No Known Problems Maternal Grandmother     No Known Problems Maternal Grandfather     Asthma Paternal Grandmother     No Known Problems Daughter      I have reviewed and agree with the history as documented.    E-Cigarette/Vaping    E-Cigarette Use Never User      E-Cigarette/Vaping Substances    Nicotine No     THC No     CBD No     Flavoring No     Other No     Unknown No      Social History     Tobacco Use    Smoking status: Never    Smokeless tobacco: Never   Vaping Use    Vaping status: Never Used   Substance Use Topics    Alcohol use: Yes     Comment: Once every 6 months    Drug use: Never       Review of Systems   Constitutional:  Negative for chills and fever.   HENT:  Negative for dental problem and ear pain.    Eyes:  Negative for pain and redness.   Respiratory:  Negative for cough and shortness of breath.    Cardiovascular:  Negative for chest pain and palpitations.   Gastrointestinal:  Positive for abdominal pain and nausea.   Endocrine: Negative for polydipsia and polyphagia.   Genitourinary:  Negative for dysuria and frequency.   Musculoskeletal:  Negative for arthralgias and joint swelling.   Skin:  Negative for color change  and rash.   Neurological:  Negative for dizziness and headaches.   Psychiatric/Behavioral:  Negative for behavioral problems and confusion.    All other systems reviewed and are negative.      Physical Exam  Physical Exam  Vitals and nursing note reviewed.   Constitutional:       General: She is not in acute distress.     Appearance: She is well-developed. She is not diaphoretic.   HENT:      Head: Atraumatic.      Right Ear: External ear normal.      Left Ear: External ear normal.      Nose: Nose normal.   Eyes:      Conjunctiva/sclera: Conjunctivae normal.      Pupils: Pupils are equal, round, and reactive to light.   Neck:      Vascular: No JVD.   Cardiovascular:      Rate and Rhythm: Normal rate and regular rhythm.      Heart sounds: Normal heart sounds. No murmur heard.  Pulmonary:      Effort: Pulmonary effort is normal. No respiratory distress.      Breath sounds: Normal breath sounds. No wheezing.   Abdominal:      General: Bowel sounds are normal. There is no distension.      Palpations: Abdomen is soft.      Tenderness: There is abdominal tenderness in the left lower quadrant.   Musculoskeletal:         General: Normal range of motion.      Cervical back: Normal range of motion and neck supple.   Skin:     General: Skin is warm and dry.      Capillary Refill: Capillary refill takes less than 2 seconds.   Neurological:      Mental Status: She is alert and oriented to person, place, and time.      Cranial Nerves: No cranial nerve deficit.   Psychiatric:         Behavior: Behavior normal.         Vital Signs  ED Triage Vitals   Temperature Pulse Respirations Blood Pressure SpO2   02/21/24 1347 02/21/24 1347 02/21/24 1347 02/21/24 1347 02/21/24 1347   97.6 °F (36.4 °C) 88 20 (!) 189/99 99 %      Temp Source Heart Rate Source Patient Position - Orthostatic VS BP Location FiO2 (%)   02/21/24 1347 02/21/24 1347 02/21/24 1347 02/21/24 1347 --   Temporal Monitor Sitting Left arm       Pain Score       02/21/24 1500        2           Vitals:    02/21/24 1347 02/21/24 1606 02/21/24 1824   BP: (!) 189/99 138/69 132/77   Pulse: 88 89 88   Patient Position - Orthostatic VS: Sitting Lying Lying         Visual Acuity      ED Medications  Medications   morphine injection 4 mg (4 mg Intravenous Given 2/21/24 1500)   ketorolac (TORADOL) injection 15 mg (15 mg Intravenous Given 2/21/24 1501)   ondansetron (ZOFRAN) injection 4 mg (4 mg Intravenous Given 2/21/24 1501)   iohexol (OMNIPAQUE) 350 MG/ML injection (MULTI-DOSE) 100 mL (100 mL Intravenous Given 2/21/24 1753)       Diagnostic Studies  Results Reviewed       Procedure Component Value Units Date/Time    POCT pregnancy, urine [[id number]]  (Normal) Resulted: 02/21/24 1736    Lab Status: Final result Updated: 02/21/24 1736     EXT Preg Test, Ur Negative     Control Valid    Urine Microscopic [[id number]]  (Abnormal) Collected: 02/21/24 1533    Lab Status: Final result Specimen: Urine, Clean Catch Updated: 02/21/24 1548     RBC, UA 4-10 /hpf      WBC, UA 2-4 /hpf      Epithelial Cells Moderate /hpf      Bacteria, UA Occasional /hpf      MUCUS THREADS Moderate    UA w Reflex to Microscopic w Reflex to Culture [[id number]]  (Abnormal) Collected: 02/21/24 1533    Lab Status: Final result Specimen: Urine, Clean Catch Updated: 02/21/24 1545     Color, UA Yellow     Clarity, UA Turbid     Specific Gravity, UA 1.044     pH, UA 5.5     Leukocytes, UA Negative     Nitrite, UA Negative     Protein, UA 30 (1+) mg/dl      Glucose, UA Negative mg/dl      Ketones, UA Trace mg/dl      Urobilinogen, UA 2.0 mg/dl      Bilirubin, UA Negative     Occult Blood, UA Negative    Comprehensive metabolic panel [[id number]] Collected: 02/21/24 1456    Lab Status: Final result Specimen: Blood from Arm, Left Updated: 02/21/24 1518     Sodium 138 mmol/L      Potassium 3.8 mmol/L      Chloride 106 mmol/L      CO2 25 mmol/L      ANION GAP 7 mmol/L      BUN 12 mg/dL      Creatinine 0.73 mg/dL      Glucose 96 mg/dL       Calcium 9.2 mg/dL      AST 14 U/L      ALT 10 U/L      Alkaline Phosphatase 82 U/L      Total Protein 7.2 g/dL      Albumin 4.3 g/dL      Total Bilirubin 0.24 mg/dL      eGFR 108 ml/min/1.73sq m     Narrative:      National Kidney Disease Foundation guidelines for Chronic Kidney Disease (CKD):     Stage 1 with normal or high GFR (GFR > 90 mL/min/1.73 square meters)    Stage 2 Mild CKD (GFR = 60-89 mL/min/1.73 square meters)    Stage 3A Moderate CKD (GFR = 45-59 mL/min/1.73 square meters)    Stage 3B Moderate CKD (GFR = 30-44 mL/min/1.73 square meters)    Stage 4 Severe CKD (GFR = 15-29 mL/min/1.73 square meters)    Stage 5 End Stage CKD (GFR <15 mL/min/1.73 square meters)  Note: GFR calculation is accurate only with a steady state creatinine    CBC and differential [[id number]]  (Abnormal) Collected: 02/21/24 1456    Lab Status: Final result Specimen: Blood from Arm, Left Updated: 02/21/24 1502     WBC 8.27 Thousand/uL      RBC 4.93 Million/uL      Hemoglobin 11.1 g/dL      Hematocrit 35.8 %      MCV 73 fL      MCH 22.5 pg      MCHC 31.0 g/dL      RDW 16.7 %      MPV 9.7 fL      Platelets 372 Thousands/uL      nRBC 0 /100 WBCs      Neutrophils Relative 64 %      Immat GRANS % 0 %      Lymphocytes Relative 27 %      Monocytes Relative 7 %      Eosinophils Relative 2 %      Basophils Relative 0 %      Neutrophils Absolute 5.25 Thousands/µL      Immature Grans Absolute 0.03 Thousand/uL      Lymphocytes Absolute 2.25 Thousands/µL      Monocytes Absolute 0.58 Thousand/µL      Eosinophils Absolute 0.13 Thousand/µL      Basophils Absolute 0.03 Thousands/µL     Pregnancy Test (HCG Qualitative) [[id number]]     Lab Status: No result Specimen: Blood from Arm, Left                    CT abdomen pelvis with contrast   Final Result by German Caceres MD (02/21 1822)      Inflamed fat nodule adjacent to the descending colon image 2/93 consistent with acute epiploic appendagitis.            Workstation performed: [id number]                     Procedures  Procedures         ED Course                               SBIRT 22yo+      Flowsheet Row Most Recent Value   Initial Alcohol Screen: US AUDIT-C     1. How often do you have a drink containing alcohol? 0 Filed at: 02/21/2024 1527   2. How many drinks containing alcohol do you have on a typical day you are drinking?  0 Filed at: 02/21/2024 1527   3b. FEMALE Any Age, or MALE 65+: How often do you have 4 or more drinks on one occassion? 0 Filed at: 02/21/2024 1527   Audit-C Score 0 Filed at: 02/21/2024 1527   BABAK: How many times in the past year have you...    Used an illegal drug or used a prescription medication for non-medical reasons? Never Filed at: 02/21/2024 1527                      Medical Decision Making  Amount and/or Complexity of Data Reviewed  Labs: ordered.  Radiology: ordered.    Risk  Prescription drug management.           CT scan consistent with epiploic appendagitis, patient appears well, discussed supportive care and primary care follow-up.  Disposition  Final diagnoses:   Epiploic appendagitis     Time reflects when diagnosis was documented in both MDM as applicable and the Disposition within this note       Time User Action Codes Description Comment    2/21/2024  6:41 PM Abi Pierre Add [K63.89] Epiploic appendagitis           ED Disposition       ED Disposition   Discharge    Condition   Stable    Date/Time   Wed Feb 21, 2024 1841    Comment   Nina Brush discharge to home/self care.                   Follow-up Information       Follow up With Specialties Details Why Contact Info Additional Information    [facility] Emergency Department Emergency Medicine  As needed [address] [id number] [phone] [facility] Emergency Department, [address], [place], [address]            Discharge Medication List as of 2/21/2024  6:42 PM        CONTINUE these medications which have NOT CHANGED     Details   !! albuterol (ProAir HFA) 90 mcg/act inhaler Inhale 2 puffs every 6 (six) hours as needed for wheezing, Starting Wed 11/2/2022, Normal      !! albuterol (ProAir HFA) 90 mcg/act inhaler Inhale 2 puffs every 6 (six) hours as needed for wheezing (or coughing fits), Starting Wed 12/6/2023, Normal      ergocalciferol (VITAMIN D2) 50,000 units Take 1 capsule (50,000 Units total) by mouth once a week, Starting Mon 5/22/2023, Until Mon 8/14/2023, Normal      fluticasone (FLONASE) 50 mcg/act nasal spray SPRAY 1 SPRAY INTO EACH NOSTRIL EVERY DAY, Normal      Iron-Vitamin C  MG TABS Take 1 tablet by mouth in the morning, Starting Mon 5/22/2023, Normal      levocetirizine (XYZAL) 5 MG tablet TAKE 1 TABLET BY MOUTH EVERY DAY IN THE EVENING, Normal      Multiple Vitamin (multivitamin) tablet Take 1 tablet by mouth daily, Historical Med      phentermine 15 MG capsule Take 1 capsule (15 mg total) by mouth every morning, Starting Wed 8/9/2023, Normal       !! - Potential duplicate medications found. Please discuss with provider.          No discharge procedures on file.    PDMP Review         Value Time User    PDMP Reviewed  Yes 8/9/2023  2:34 PM ERIN Larios            ED Provider  Electronically Signed by             Abi Pierre MD  02/21/24 0490

## 2024-03-06 ENCOUNTER — OFFICE VISIT (OUTPATIENT)
Dept: URGENT CARE | Facility: CLINIC | Age: 34
End: 2024-03-06
Payer: COMMERCIAL

## 2024-03-06 VITALS
HEIGHT: 63 IN | HEART RATE: 90 BPM | WEIGHT: 245.4 LBS | RESPIRATION RATE: 18 BRPM | SYSTOLIC BLOOD PRESSURE: 128 MMHG | TEMPERATURE: 98.1 F | OXYGEN SATURATION: 97 % | DIASTOLIC BLOOD PRESSURE: 82 MMHG | BODY MASS INDEX: 43.48 KG/M2

## 2024-03-06 DIAGNOSIS — H65.91 RIGHT NON-SUPPURATIVE OTITIS MEDIA: Primary | ICD-10-CM

## 2024-03-06 PROCEDURE — G0382 LEV 3 HOSP TYPE B ED VISIT: HCPCS | Performed by: FAMILY MEDICINE

## 2024-03-06 RX ORDER — AZITHROMYCIN 250 MG/1
TABLET, FILM COATED ORAL
Qty: 6 TABLET | Refills: 0 | Status: SHIPPED | OUTPATIENT
Start: 2024-03-06 | End: 2024-03-10

## 2024-03-06 NOTE — PATIENT INSTRUCTIONS
Ibuprofen 600 mg every 6 hours for fever, headaches, body aches.  May use acetaminophen/Tylenol between doses of ibuprofen.  Mucinex/guaifenesin for congestion and mucousy cough.  Delsym for dry cough and cough suppression.  Cepacol throat lozenges for sore throat.    Follow up with PCP in 3-5 days.  Proceed to  ER if symptoms worsen.    If tests have been performed at [facility], our office will contact you with results if changes need to be made to the care plan discussed with you at the visit.  You can review your full results on [facility] MyChart.

## 2024-03-06 NOTE — PROGRESS NOTES
[facility]        NAME: Nina Brush is a 33 y.o. female  : 1990    MRN: [id number]  DATE: 2024  TIME: 2:00 PM    Assessment and Plan   Right non-suppurative otitis media [H65.91]  1. Right non-suppurative otitis media  azithromycin (ZITHROMAX) 250 mg tablet            Patient Instructions     Ibuprofen 600 mg every 6 hours for fever, headaches, body aches.  May use acetaminophen/Tylenol between doses of ibuprofen.  Mucinex/guaifenesin for congestion and mucousy cough.  Delsym for dry cough and cough suppression.  Cepacol throat lozenges for sore throat.    Follow up with PCP in 3-5 days.  Proceed to  ER if symptoms worsen.    If tests have been performed at [facility], our office will contact you with results if changes need to be made to the care plan discussed with you at the visit.  You can review your full results on [facility] MyChart.    Chief Complaint     Chief Complaint   Patient presents with   • Cold Like Symptoms     Pt complains of a sore throat and congestion that started Piter 3/3/2024. Pt woke up today with R ear pain, describes it as a stabbing pain. Took Dayquil and Nyquil for symptoms, somewhat relief.          History of Present Illness       Cold Like Symptoms (Pt complains of a sore throat and congestion that started Piter 3/3/2024. Pt woke up today with R ear pain, describes it as a stabbing pain. Took Dayquil and Nyquil for symptoms, somewhat relief. )    Sore Throat   This is a new problem. The current episode started in the past 7 days. The problem has been gradually worsening. The pain is worse on the right side. The maximum temperature recorded prior to her arrival was 101 - 101.9 F. The fever has been present for 1 to 2 days. The pain is at a severity of 7/10. Associated symptoms include congestion, coughing, ear pain and neck pain. Pertinent negatives include no abdominal pain, diarrhea, drooling, ear discharge, headaches, hoarse voice, plugged ear  sensation, shortness of breath, stridor, swollen glands, trouble swallowing or vomiting. She has had no exposure to strep or mono.       Review of Systems   Review of Systems   Constitutional:  Positive for chills and fatigue.   HENT:  Positive for congestion, ear pain and sore throat. Negative for drooling, ear discharge, hoarse voice and trouble swallowing.    Respiratory:  Positive for cough. Negative for shortness of breath and stridor.    Gastrointestinal:  Negative for abdominal pain, diarrhea and vomiting.   Musculoskeletal:  Positive for neck pain.   Neurological:  Negative for headaches.         Current Medications       Current Outpatient Medications:   •  albuterol (ProAir HFA) 90 mcg/act inhaler, Inhale 2 puffs every 6 (six) hours as needed for wheezing, Disp: 8.5 g, Rfl: 0  •  albuterol (ProAir HFA) 90 mcg/act inhaler, Inhale 2 puffs every 6 (six) hours as needed for wheezing (or coughing fits), Disp: 8.5 g, Rfl: 0  •  azithromycin (ZITHROMAX) 250 mg tablet, Take 2 tablets today then 1 tablet daily x 4 days, Disp: 6 tablet, Rfl: 0  •  ergocalciferol (VITAMIN D2) 50,000 units, Take 1 capsule (50,000 Units total) by mouth once a week, Disp: 12 capsule, Rfl: 0  •  fluticasone (FLONASE) 50 mcg/act nasal spray, SPRAY 1 SPRAY INTO EACH NOSTRIL EVERY DAY (Patient not taking: Reported on 3/6/2024), Disp: 48 mL, Rfl: 2  •  Iron-Vitamin C  MG TABS, Take 1 tablet by mouth in the morning (Patient not taking: Reported on 3/6/2024), Disp: 90 tablet, Rfl: 1  •  levocetirizine (XYZAL) 5 MG tablet, TAKE 1 TABLET BY MOUTH EVERY DAY IN THE EVENING (Patient not taking: Reported on 3/6/2024), Disp: 90 tablet, Rfl: 1  •  Multiple Vitamin (multivitamin) tablet, Take 1 tablet by mouth daily (Patient not taking: Reported on 3/6/2024), Disp: , Rfl:   •  phentermine 15 MG capsule, Take 1 capsule (15 mg total) by mouth every morning, Disp: 30 capsule, Rfl: 0    Current Facility-Administered Medications:   •  cyanocobalamin  injection 1,000 mcg, 1,000 mcg, Intramuscular, Weekly, Urmila Nitza, CRNP, 1,000 mcg at 23 1409  •  cyanocobalamin injection 1,000 mcg, 1,000 mcg, Intramuscular, Q30 Days, Urmila Nitza, CRNP, 1,000 mcg at 23 1412  •  cyanocobalamin injection 1,000 mcg, 1,000 mcg, Intramuscular, Q30 Days, Urmila Nitza, CRNP, 1,000 mcg at 23 1403    Current Allergies     Allergies as of 2024 - Reviewed 2024   Allergen Reaction Noted   • Cat hair extract  2013   • Penicillins Other (See Comments) 2022   • Pollen extract  2013            The following portions of the patient's history were reviewed and updated as appropriate: allergies, current medications, past family history, past medical history, past social history, past surgical history and problem list.     Past Medical History:   Diagnosis Date   • Allergic    • Anemia     with pregnancy on iron   • Asthma     last used inhaler one year ago   • Hypertension    • Migraine    • UTI (urinary tract infection)    • Varicella     has disease as child       Past Surgical History:   Procedure Laterality Date   •  SECTION     • HERNIA REPAIR     • INDUCED      • SD  DELIVERY ONLY N/A 2016    Procedure:  SECTION () REPEAT;  Surgeon: Mayra Carvajal MD;  Location: [facility];  Service: Obstetrics   • SD  DELIVERY ONLY N/A 10/30/2020    Procedure:  SECTION () REPEAT;  Surgeon: Emma Mejia MD;  Location: [facility] ;  Service: Obstetrics   • SKIN TAG REMOVAL     • TUBAL LIGATION Bilateral 10/30/2020    Procedure: LIGATION/COAGULATION TUBAL;  Surgeon: Emma Mejia MD;  Location: [facility] ;  Service: Obstetrics       Family History   Problem Relation Age of Onset   • Diabetes Maternal Aunt         type 1   • No Known Problems Mother    • Asthma Father    • No Known Problems Daughter    • No Known Problems Maternal Grandmother    • No Known Problems Maternal Grandfather    • Asthma  "Paternal Grandmother    • No Known Problems Daughter          Medications have been verified.        Objective   /82   Pulse 90   Temp 98.1 °F (36.7 °C)   Resp 18   Ht 5' 3\" (1.6 m)   Wt 111 kg (245 lb 6.4 oz)   LMP 01/15/2024   SpO2 97%   BMI 43.47 kg/m²   Patient's last menstrual period was 01/15/2024.       Physical Exam     Physical Exam  Vitals and nursing note reviewed.   Constitutional:       General: She is not in acute distress.     Appearance: Normal appearance. She is well-developed.   HENT:      Right Ear: External ear normal. Tympanic membrane is injected and bulging.      Left Ear: Tympanic membrane and external ear normal.      Nose: Nose normal.      Mouth/Throat:      Pharynx: No oropharyngeal exudate.   Eyes:      Conjunctiva/sclera: Conjunctivae normal.   Cardiovascular:      Rate and Rhythm: Normal rate and regular rhythm.      Heart sounds: Normal heart sounds. No murmur heard.  Pulmonary:      Effort: Pulmonary effort is normal. No respiratory distress.      Breath sounds: Normal breath sounds. No wheezing or rales.   Chest:      Chest wall: No tenderness.   Musculoskeletal:         General: Normal range of motion.      Cervical back: Normal range of motion and neck supple.   Lymphadenopathy:      Cervical: Cervical adenopathy present.      Right cervical: Superficial cervical adenopathy present.   Skin:     General: Skin is warm.      Findings: No erythema or rash.   Neurological:      Mental Status: She is alert and oriented to person, place, and time.               "

## 2024-03-09 ENCOUNTER — OFFICE VISIT (OUTPATIENT)
Dept: URGENT CARE | Facility: CLINIC | Age: 34
End: 2024-03-09
Payer: COMMERCIAL

## 2024-03-09 VITALS
RESPIRATION RATE: 18 BRPM | HEART RATE: 76 BPM | SYSTOLIC BLOOD PRESSURE: 124 MMHG | DIASTOLIC BLOOD PRESSURE: 85 MMHG | TEMPERATURE: 97.4 F | OXYGEN SATURATION: 95 %

## 2024-03-09 DIAGNOSIS — J03.90 TONSILLITIS: Primary | ICD-10-CM

## 2024-03-09 LAB — S PYO AG THROAT QL: NEGATIVE

## 2024-03-09 PROCEDURE — 87880 STREP A ASSAY W/OPTIC: CPT

## 2024-03-09 PROCEDURE — G0382 LEV 3 HOSP TYPE B ED VISIT: HCPCS

## 2024-03-09 RX ORDER — PREDNISONE 20 MG/1
20 TABLET ORAL 2 TIMES DAILY
Qty: 10 TABLET | Refills: 0 | Status: SHIPPED | OUTPATIENT
Start: 2024-03-09 | End: 2024-03-14

## 2024-03-09 RX ORDER — CEPHALEXIN 500 MG/1
500 CAPSULE ORAL EVERY 12 HOURS SCHEDULED
Qty: 20 CAPSULE | Refills: 0 | Status: SHIPPED | OUTPATIENT
Start: 2024-03-09 | End: 2024-03-19

## 2024-03-09 NOTE — PATIENT INSTRUCTIONS
Strep A test was negative in the office today however with your symptoms and from physical exam, have you start antibiotics that was ordered  Change your toothbrush 24 hours after starting antibiotics as bacteria can be reintroduced by what grows on your toothbrush.  For sore throat:  Salt water gurgle  Chloraseptic throat spray  Honey  Throat lozenges  OTC pain medication such as Tylenol or Ibuprofen      Follow up with Primary Care Provider in 3-5 days if not improving.  Proceed to Emergency Department if symptoms worsen.    If tests have been performed at [facility], our office will contact you with results if changes need to be made to the care plan discussed with you at the visit.  You can review your full results on [facility] MyChart.

## 2024-03-09 NOTE — PROGRESS NOTES
[facility]        NAME: Nina Brush is a 33 y.o. female  : 1990    MRN: [id number]  DATE: 2024  TIME: 11:36 AM    Assessment and Plan   Tonsillitis [J03.90]  1. Tonsillitis  POCT rapid strepA    cephalexin (KEFLEX) 500 mg capsule    predniSONE 20 mg tablet            Patient Instructions   Strep A test was negative in the office today however with your symptoms and from physical exam, have you start antibiotics that was ordered  Change your toothbrush 24 hours after starting antibiotics as bacteria can be reintroduced by what grows on your toothbrush.  For sore throat:  Salt water gurgle  Chloraseptic throat spray  Honey  Throat lozenges  OTC pain medication such as Tylenol or Ibuprofen      Follow up with Primary Care Provider in 3-5 days if not improving.  Proceed to Emergency Department if symptoms worsen.    If tests have been performed at [facility], our office will contact you with results if changes need to be made to the care plan discussed with you at the visit.  You can review your full results on [facility] MyChart.    Chief Complaint     Chief Complaint   Patient presents with   • Earache     Patient here with right ear pain and right sided sore throat persistent since her first visit 3 days ago. Patient only has one more day left of antibiotic script for ear infection however she does not feel any better. Patient states she also sees white spots in the back of her throat.          History of Present Illness       Earache   Associated symptoms include a sore throat. Pertinent negatives include no abdominal pain, coughing, rash or vomiting.       Review of Systems   Review of Systems   Constitutional:  Positive for fatigue. Negative for chills and fever.   HENT:  Positive for ear pain and sore throat. Negative for congestion and trouble swallowing (Due to pain).    Eyes:  Negative for pain and visual disturbance.   Respiratory:  Negative for cough and shortness of breath.     Cardiovascular:  Negative for chest pain and palpitations.   Gastrointestinal:  Negative for abdominal pain and vomiting.   Genitourinary:  Negative for dysuria and hematuria.   Musculoskeletal:  Negative for arthralgias and back pain.   Skin:  Negative for color change and rash.   Neurological:  Negative for seizures and syncope.   All other systems reviewed and are negative.        Current Medications       Current Outpatient Medications:   •  albuterol (ProAir HFA) 90 mcg/act inhaler, Inhale 2 puffs every 6 (six) hours as needed for wheezing (or coughing fits), Disp: 8.5 g, Rfl: 0  •  azithromycin (ZITHROMAX) 250 mg tablet, Take 2 tablets today then 1 tablet daily x 4 days, Disp: 6 tablet, Rfl: 0  •  cephalexin (KEFLEX) 500 mg capsule, Take 1 capsule (500 mg total) by mouth every 12 (twelve) hours for 10 days, Disp: 20 capsule, Rfl: 0  •  predniSONE 20 mg tablet, Take 1 tablet (20 mg total) by mouth 2 (two) times a day for 5 days, Disp: 10 tablet, Rfl: 0  •  albuterol (ProAir HFA) 90 mcg/act inhaler, Inhale 2 puffs every 6 (six) hours as needed for wheezing (Patient not taking: Reported on 3/9/2024), Disp: 8.5 g, Rfl: 0  •  ergocalciferol (VITAMIN D2) 50,000 units, Take 1 capsule (50,000 Units total) by mouth once a week, Disp: 12 capsule, Rfl: 0  •  fluticasone (FLONASE) 50 mcg/act nasal spray, SPRAY 1 SPRAY INTO EACH NOSTRIL EVERY DAY (Patient not taking: Reported on 3/6/2024), Disp: 48 mL, Rfl: 2  •  Iron-Vitamin C  MG TABS, Take 1 tablet by mouth in the morning (Patient not taking: Reported on 3/6/2024), Disp: 90 tablet, Rfl: 1  •  levocetirizine (XYZAL) 5 MG tablet, TAKE 1 TABLET BY MOUTH EVERY DAY IN THE EVENING (Patient not taking: Reported on 3/6/2024), Disp: 90 tablet, Rfl: 1  •  Multiple Vitamin (multivitamin) tablet, Take 1 tablet by mouth daily (Patient not taking: Reported on 3/6/2024), Disp: , Rfl:   •  phentermine 15 MG capsule, Take 1 capsule (15 mg total) by mouth every morning, Disp: 30  capsule, Rfl: 0    Current Facility-Administered Medications:   •  cyanocobalamin injection 1,000 mcg, 1,000 mcg, Intramuscular, Weekly, Urmila Nitza, CRNP, 1,000 mcg at 23 1409  •  cyanocobalamin injection 1,000 mcg, 1,000 mcg, Intramuscular, Q30 Days, Urmila Nitza, CRNP, 1,000 mcg at 23 1412  •  cyanocobalamin injection 1,000 mcg, 1,000 mcg, Intramuscular, Q30 Days, Urmila Nitza, CRNP, 1,000 mcg at 23 1403    Current Allergies     Allergies as of 2024 - Reviewed 2024   Allergen Reaction Noted   • Cat hair extract  2013   • Penicillins Other (See Comments) 2022   • Pollen extract  2013            The following portions of the patient's history were reviewed and updated as appropriate: allergies, current medications, past family history, past medical history, past social history, past surgical history and problem list.     Past Medical History:   Diagnosis Date   • Allergic    • Anemia     with pregnancy on iron   • Asthma     last used inhaler one year ago   • Hypertension    • Migraine    • UTI (urinary tract infection)    • Varicella     has disease as child       Past Surgical History:   Procedure Laterality Date   •  SECTION     • HERNIA REPAIR     • INDUCED      • PA  DELIVERY ONLY N/A 2016    Procedure:  SECTION () REPEAT;  Surgeon: Mayra Carvajal MD;  Location: [facility];  Service: Obstetrics   • PA  DELIVERY ONLY N/A 10/30/2020    Procedure:  SECTION () REPEAT;  Surgeon: Emma Mejia MD;  Location: [facility] ;  Service: Obstetrics   • SKIN TAG REMOVAL     • TUBAL LIGATION Bilateral 10/30/2020    Procedure: LIGATION/COAGULATION TUBAL;  Surgeon: Emma Mejia MD;  Location: [facility] ;  Service: Obstetrics       Family History   Problem Relation Age of Onset   • Diabetes Maternal Aunt         type 1   • No Known Problems Mother    • Asthma Father    • No Known Problems Daughter    • No Known  Problems Maternal Grandmother    • No Known Problems Maternal Grandfather    • Asthma Paternal Grandmother    • No Known Problems Daughter          Medications have been verified.        Objective   /85   Pulse 76   Temp (!) 97.4 °F (36.3 °C)   Resp 18   LMP 01/15/2024   SpO2 95%   Patient's last menstrual period was 01/15/2024.       Physical Exam     Physical Exam  Vitals and nursing note reviewed.   Constitutional:       Appearance: Normal appearance.   HENT:      Head: Normocephalic and atraumatic.      Right Ear: Tympanic membrane normal.      Left Ear: Tympanic membrane normal.      Nose: Nose normal.      Mouth/Throat:      Mouth: Mucous membranes are moist.      Pharynx: Oropharyngeal exudate and posterior oropharyngeal erythema present.      Tonsils: Tonsillar exudate present. 2+ on the right. 2+ on the left.   Pulmonary:      Effort: Pulmonary effort is normal.   Musculoskeletal:      Cervical back: Tenderness present.   Lymphadenopathy:      Cervical: Cervical adenopathy present.   Skin:     General: Skin is warm and dry.      Capillary Refill: Capillary refill takes less than 2 seconds.   Neurological:      General: No focal deficit present.      Mental Status: She is alert and oriented to person, place, and time. Mental status is at baseline.      Sensory: No sensory deficit.      Motor: No weakness.   Psychiatric:         Mood and Affect: Mood normal.         Behavior: Behavior normal.         Thought Content: Thought content normal.

## 2025-05-29 ENCOUNTER — TELEPHONE (OUTPATIENT)
Age: 35
End: 2025-05-29

## 2025-05-29 NOTE — TELEPHONE ENCOUNTER
Patient returned missed call. She would like to keep her appointment with Madai and have her as her pcp

## 2025-06-12 ENCOUNTER — OFFICE VISIT (OUTPATIENT)
Dept: FAMILY MEDICINE CLINIC | Facility: CLINIC | Age: 35
End: 2025-06-12
Payer: COMMERCIAL

## 2025-06-12 VITALS
BODY MASS INDEX: 38.79 KG/M2 | HEIGHT: 63 IN | SYSTOLIC BLOOD PRESSURE: 126 MMHG | TEMPERATURE: 97.6 F | OXYGEN SATURATION: 97 % | HEART RATE: 88 BPM | WEIGHT: 218.9 LBS | DIASTOLIC BLOOD PRESSURE: 84 MMHG

## 2025-06-12 DIAGNOSIS — E04.9 ENLARGED THYROID: ICD-10-CM

## 2025-06-12 DIAGNOSIS — J45.21 MILD INTERMITTENT ASTHMA WITH ACUTE EXACERBATION: ICD-10-CM

## 2025-06-12 DIAGNOSIS — Z00.00 ANNUAL PHYSICAL EXAM: Primary | ICD-10-CM

## 2025-06-12 DIAGNOSIS — N92.0 MENORRHAGIA WITH REGULAR CYCLE: ICD-10-CM

## 2025-06-12 PROCEDURE — 99395 PREV VISIT EST AGE 18-39: CPT | Performed by: NURSE PRACTITIONER

## 2025-06-12 PROCEDURE — 99214 OFFICE O/P EST MOD 30 MIN: CPT | Performed by: NURSE PRACTITIONER

## 2025-06-12 NOTE — PROGRESS NOTES
Adult Annual Physical  Name: Nina Brush      : 1990      MRN: [id number]  Encounter Provider: ERIN Huitron  Encounter Date: 2025   Encounter department: [facility] [address]    :  Assessment & Plan  Annual physical exam  To obtain pap test and eye exam. UTD on dental cleaning.        Mild intermittent asthma with acute exacerbation  Stable to continue current medications.        Menorrhagia with regular cycle  Obtained pelvic u/s 2017 which revealed numerous small follicles in both ovaries, correlate with clinical findings for PCOS. Counseled patient on the fact that this could be the cause for heavy menses.  To obtain labs, will call with results.  Provided referral to GYN.   Orders:    CBC and differential; Future    Comprehensive metabolic panel; Future    Hemoglobin A1C; Future    Lipid Panel with Direct LDL reflex; Future    TSH, 3rd generation with Free T4 reflex; Future    Iron Panel (Includes Ferritin, Iron Sat%, Iron, and TIBC); Future    Ambulatory Referral to Obstetrics / Gynecology; Future    Enlarged thyroid  To obtain thyroid u/s and TFTs testing. Will call with results.   Orders:    US thyroid; Future        Preventive Screenings:  - Diabetes Screening: orders placed  - Cholesterol Screening: orders placed   - Hepatitis C screening: screening up-to-date   - HIV screening: screening up-to-date   - Cervical cancer screening: orders placed   - Colon cancer screening: screening not indicated   - Lung cancer screening: screening not indicated     Counseling/Anticipatory Guidance:    - Dental health: discussed importance of regular tooth brushing, flossing, and dental visits.   - Sexual health: discussed sexually transmitted diseases, partner selection, use of condoms, avoidance of unintended pregnancy, and contraceptive alternatives.   - Diet: discussed recommendations for a healthy/well-balanced diet.   - Exercise: the importance of regular  "exercise/physical activity was discussed. Recommend exercise 3-5 times per week for at least 30 minutes.   - Injury prevention: discussed safety/seat belts, safety helmets, smoke detectors, carbon monoxide detectors, and smoking near bedding or upholstery.          History of Present Illness     Adult Annual Physical:  Patient presents for annual physical. Mense 5-7 days  Last child 5 years old  .     Diet and Physical Activity:  - Diet/Nutrition: no special diet.  - Exercise: walking, 5-7 times a week on average and 30-60 minutes on average.    Depression Screening:  - PHQ-2 Score: 0    General Health:  - Sleep: sleeps well.  - Hearing: normal hearing bilateral ears.  - Vision: most recent eye exam > 1 year ago.  - Dental: regular dental visits.    /GYN Health:    - Menopause: premenopausal.   - Last menstrual cycle: 6/12/2025.   - Contraception: tubal ligation.      Review of Systems   Constitutional: Negative.    HENT: Negative.     Eyes: Negative.    Respiratory: Negative.     Cardiovascular: Negative.    Gastrointestinal:  Positive for constipation (occasionally).   Endocrine: Negative.    Genitourinary: Negative.    Musculoskeletal: Negative.    Skin: Negative.    Allergic/Immunologic: Negative.    Neurological: Negative.    Hematological: Negative.    Psychiatric/Behavioral: Negative.       Medications Ordered Prior to Encounter[1]     Objective   /84 (BP Location: Right arm, Patient Position: Sitting)   Pulse 88   Temp 97.6 °F (36.4 °C)   Ht 5' 3\" (1.6 m)   Wt 99.3 kg (218 lb 14.4 oz)   SpO2 97%   BMI 38.78 kg/m²     Physical Exam  Vitals and nursing note reviewed.   Constitutional:       General: She is not in acute distress.     Appearance: Normal appearance. She is well-developed. She is not ill-appearing, toxic-appearing or diaphoretic.   HENT:      Head: Normocephalic and atraumatic.      Right Ear: Tympanic membrane and external ear normal.      Left Ear: Tympanic membrane and external " ear normal.      Nose: Nose normal.      Mouth/Throat:      Mouth: Mucous membranes are moist.      Pharynx: Oropharynx is clear. No oropharyngeal exudate.     Eyes:      Conjunctiva/sclera: Conjunctivae normal.      Pupils: Pupils are equal, round, and reactive to light.     Neck:      Thyroid: Thyromegaly present.     Cardiovascular:      Rate and Rhythm: Normal rate and regular rhythm.      Pulses: Normal pulses.      Heart sounds: Normal heart sounds. No murmur heard.  Pulmonary:      Effort: Pulmonary effort is normal. No respiratory distress.      Breath sounds: Normal breath sounds. No stridor. No wheezing or rales.   Chest:      Chest wall: No tenderness.   Abdominal:      General: Bowel sounds are normal. There is no distension.      Palpations: Abdomen is soft. There is no mass.      Tenderness: There is no abdominal tenderness. There is no guarding or rebound.      Hernia: No hernia is present.     Musculoskeletal:         General: Normal range of motion.      Cervical back: Normal range of motion and neck supple.   Lymphadenopathy:      Cervical: No cervical adenopathy.     Skin:     General: Skin is warm and dry.      Capillary Refill: Capillary refill takes less than 2 seconds.     Neurological:      General: No focal deficit present.      Mental Status: She is alert and oriented to person, place, and time.      Cranial Nerves: No cranial nerve deficit.      Gait: Gait normal.     Psychiatric:         Mood and Affect: Mood normal.         Behavior: Behavior normal.         Thought Content: Thought content normal.         Judgment: Judgment normal.              [1]   Current Outpatient Medications on File Prior to Visit   Medication Sig Dispense Refill    albuterol (ProAir HFA) 90 mcg/act inhaler Inhale 2 puffs every 6 (six) hours as needed for wheezing (or coughing fits) 8.5 g 0    ergocalciferol (VITAMIN D2) 50,000 units Take 1 capsule (50,000 Units total) by mouth once a week 12 capsule 0     Iron-Vitamin C  MG TABS Take 1 tablet by mouth in the morning (Patient not taking: Reported on 6/12/2025) 90 tablet 1    [DISCONTINUED] albuterol (ProAir HFA) 90 mcg/act inhaler Inhale 2 puffs every 6 (six) hours as needed for wheezing (Patient not taking: Reported on 3/9/2024) 8.5 g 0    [DISCONTINUED] fluticasone (FLONASE) 50 mcg/act nasal spray SPRAY 1 SPRAY INTO EACH NOSTRIL EVERY DAY (Patient not taking: Reported on 6/12/2025) 48 mL 2    [DISCONTINUED] levocetirizine (XYZAL) 5 MG tablet TAKE 1 TABLET BY MOUTH EVERY DAY IN THE EVENING (Patient not taking: Reported on 6/12/2025) 90 tablet 1    [DISCONTINUED] Multiple Vitamin (multivitamin) tablet Take 1 tablet by mouth daily (Patient not taking: Reported on 3/6/2024)      [DISCONTINUED] phentermine 15 MG capsule Take 1 capsule (15 mg total) by mouth every morning 30 capsule 0     Current Facility-Administered Medications on File Prior to Visit   Medication Dose Route Frequency Provider Last Rate Last Admin    cyanocobalamin injection 1,000 mcg  1,000 mcg Intramuscular Weekly ERIN Greer   1,000 mcg at 07/19/23 1409    cyanocobalamin injection 1,000 mcg  1,000 mcg Intramuscular Q30 Days ERIN Greer   1,000 mcg at 07/26/23 1412    cyanocobalamin injection 1,000 mcg  1,000 mcg Intramuscular Q30 Days ERIN Greer   1,000 mcg at 08/02/23 1403

## 2025-06-22 ENCOUNTER — HOSPITAL ENCOUNTER (OUTPATIENT)
Dept: ULTRASOUND IMAGING | Facility: HOSPITAL | Age: 35
Discharge: HOME | End: 2025-06-22
Payer: COMMERCIAL

## 2025-06-22 DIAGNOSIS — E04.9 ENLARGED THYROID: ICD-10-CM

## 2025-06-22 PROCEDURE — 76536 US EXAM OF HEAD AND NECK: CPT

## (undated) DEVICE — DRESSING TELFA 2 X 3 IN STRL

## (undated) DEVICE — GLOVE INDICATOR PI UNDERGLOVE SZ 6.5 BLUE

## (undated) DEVICE — SUT MONOCRYL 0 CTX 36 IN Y398H

## (undated) DEVICE — GLOVE SRG BIOGEL ECLIPSE 6.5

## (undated) DEVICE — CHLORAPREP HI-LITE 10.5ML ORANGE

## (undated) DEVICE — SUT VICRYL 0 CT-1 36 IN J946H

## (undated) DEVICE — SKIN MARKER DUAL TIP WITH RULER CAP, FLEXIBLE RULER AND LABELS: Brand: DEVON

## (undated) DEVICE — Device

## (undated) DEVICE — SUT MONOCRYL 0 36 IN UNDYED Y946H

## (undated) DEVICE — GAUZE SPONGES,16 PLY: Brand: CURITY

## (undated) DEVICE — PROXIMATE PLUS MD MULTI-DIRECTIONAL RELEASE SKIN STAPLERS CONTAINS 35 STAINLESS STEEL STAPLES APPROXIMATE CLOSED DIMENSIONS: 6.9MM X 3.9MM WIDE: Brand: PROXIMATE

## (undated) DEVICE — TELFA NON-ADHERENT ABSORBENT DRESSING: Brand: TELFA

## (undated) DEVICE — SURGICEL 2 X 3

## (undated) DEVICE — SUT MONOCRYL 2-0 SH 27 IN Y417H

## (undated) DEVICE — ABDOMINAL PAD: Brand: DERMACEA

## (undated) DEVICE — SWABSTCK, BENZOIN TINCTURE, 1/PK, STRL: Brand: APLICARE

## (undated) DEVICE — PACK C-SECTION PBDS